# Patient Record
Sex: FEMALE | Race: WHITE | URBAN - METROPOLITAN AREA
[De-identification: names, ages, dates, MRNs, and addresses within clinical notes are randomized per-mention and may not be internally consistent; named-entity substitution may affect disease eponyms.]

---

## 2018-09-19 ENCOUNTER — INPATIENT (INPATIENT)
Facility: HOSPITAL | Age: 78
LOS: 7 days | Discharge: HOME HEALTH | End: 2018-09-27
Attending: HOSPITALIST | Admitting: HOSPITALIST

## 2018-09-19 VITALS
HEIGHT: 65 IN | TEMPERATURE: 100 F | SYSTOLIC BLOOD PRESSURE: 140 MMHG | DIASTOLIC BLOOD PRESSURE: 70 MMHG | HEART RATE: 113 BPM | RESPIRATION RATE: 18 BRPM | OXYGEN SATURATION: 100 % | WEIGHT: 149.91 LBS

## 2018-09-19 RX ORDER — ONDANSETRON 8 MG/1
4 TABLET, FILM COATED ORAL ONCE
Qty: 0 | Refills: 0 | Status: COMPLETED | OUTPATIENT
Start: 2018-09-19 | End: 2018-09-19

## 2018-09-19 RX ORDER — SODIUM CHLORIDE 9 MG/ML
1000 INJECTION INTRAMUSCULAR; INTRAVENOUS; SUBCUTANEOUS ONCE
Qty: 0 | Refills: 0 | Status: COMPLETED | OUTPATIENT
Start: 2018-09-19 | End: 2018-09-19

## 2018-09-20 DIAGNOSIS — E78.5 HYPERLIPIDEMIA, UNSPECIFIED: ICD-10-CM

## 2018-09-20 DIAGNOSIS — I10 ESSENTIAL (PRIMARY) HYPERTENSION: ICD-10-CM

## 2018-09-20 DIAGNOSIS — R10.31 RIGHT LOWER QUADRANT PAIN: ICD-10-CM

## 2018-09-20 DIAGNOSIS — J18.9 PNEUMONIA, UNSPECIFIED ORGANISM: ICD-10-CM

## 2018-09-20 LAB
ALBUMIN SERPL ELPH-MCNC: 4.1 G/DL — SIGNIFICANT CHANGE UP (ref 3.5–5.2)
ALP SERPL-CCNC: 53 U/L — SIGNIFICANT CHANGE UP (ref 30–115)
ALT FLD-CCNC: 12 U/L — SIGNIFICANT CHANGE UP (ref 0–41)
ANION GAP SERPL CALC-SCNC: 17 MMOL/L — HIGH (ref 7–14)
APPEARANCE UR: ABNORMAL
AST SERPL-CCNC: 19 U/L — SIGNIFICANT CHANGE UP (ref 0–41)
BACTERIA # UR AUTO: ABNORMAL
BASE EXCESS BLDA CALC-SCNC: 1.3 MMOL/L — SIGNIFICANT CHANGE UP (ref -2–2)
BASOPHILS # BLD AUTO: 0.02 K/UL — SIGNIFICANT CHANGE UP (ref 0–0.2)
BASOPHILS NFR BLD AUTO: 0.4 % — SIGNIFICANT CHANGE UP (ref 0–1)
BILIRUB SERPL-MCNC: 0.5 MG/DL — SIGNIFICANT CHANGE UP (ref 0.2–1.2)
BILIRUB UR-MCNC: NEGATIVE — SIGNIFICANT CHANGE UP
BUN SERPL-MCNC: 17 MG/DL — SIGNIFICANT CHANGE UP (ref 10–20)
CALCIUM SERPL-MCNC: 9.9 MG/DL — SIGNIFICANT CHANGE UP (ref 8.5–10.1)
CHLORIDE SERPL-SCNC: 98 MMOL/L — SIGNIFICANT CHANGE UP (ref 98–110)
CO2 SERPL-SCNC: 22 MMOL/L — SIGNIFICANT CHANGE UP (ref 17–32)
COLOR SPEC: YELLOW — SIGNIFICANT CHANGE UP
CREAT SERPL-MCNC: 0.8 MG/DL — SIGNIFICANT CHANGE UP (ref 0.7–1.5)
DIFF PNL FLD: NEGATIVE — SIGNIFICANT CHANGE UP
EOSINOPHIL # BLD AUTO: 0.04 K/UL — SIGNIFICANT CHANGE UP (ref 0–0.7)
EOSINOPHIL NFR BLD AUTO: 0.7 % — SIGNIFICANT CHANGE UP (ref 0–8)
EPI CELLS # UR: ABNORMAL /HPF
GLUCOSE SERPL-MCNC: 96 MG/DL — SIGNIFICANT CHANGE UP (ref 70–99)
GLUCOSE UR QL: NEGATIVE MG/DL — SIGNIFICANT CHANGE UP
HCO3 BLDA-SCNC: 25 MMOL/L — SIGNIFICANT CHANGE UP (ref 23–27)
HCT VFR BLD CALC: 37.3 % — SIGNIFICANT CHANGE UP (ref 37–47)
HGB BLD-MCNC: 12.9 G/DL — SIGNIFICANT CHANGE UP (ref 12–16)
HOROWITZ INDEX BLDA+IHG-RTO: 50 — SIGNIFICANT CHANGE UP
IMM GRANULOCYTES NFR BLD AUTO: 0.7 % — HIGH (ref 0.1–0.3)
KETONES UR-MCNC: NEGATIVE — SIGNIFICANT CHANGE UP
LACTATE SERPL-SCNC: 2.6 MMOL/L — HIGH (ref 0.5–2.2)
LEUKOCYTE ESTERASE UR-ACNC: ABNORMAL
LYMPHOCYTES # BLD AUTO: 0.17 K/UL — LOW (ref 1.2–3.4)
LYMPHOCYTES # BLD AUTO: 3 % — LOW (ref 20.5–51.1)
MCHC RBC-ENTMCNC: 29.7 PG — SIGNIFICANT CHANGE UP (ref 27–31)
MCHC RBC-ENTMCNC: 34.6 G/DL — SIGNIFICANT CHANGE UP (ref 32–37)
MCV RBC AUTO: 85.9 FL — SIGNIFICANT CHANGE UP (ref 81–99)
MONOCYTES # BLD AUTO: 0.43 K/UL — SIGNIFICANT CHANGE UP (ref 0.1–0.6)
MONOCYTES NFR BLD AUTO: 7.6 % — SIGNIFICANT CHANGE UP (ref 1.7–9.3)
NEUTROPHILS # BLD AUTO: 4.96 K/UL — SIGNIFICANT CHANGE UP (ref 1.4–6.5)
NEUTROPHILS NFR BLD AUTO: 87.6 % — HIGH (ref 42.2–75.2)
NITRITE UR-MCNC: NEGATIVE — SIGNIFICANT CHANGE UP
NRBC # BLD: 0 /100 WBCS — SIGNIFICANT CHANGE UP (ref 0–0)
PCO2 BLDA: 35 MMHG — LOW (ref 38–42)
PH BLDA: 7.46 — HIGH (ref 7.38–7.42)
PH UR: 6 — SIGNIFICANT CHANGE UP (ref 5–8)
PLATELET # BLD AUTO: 196 K/UL — SIGNIFICANT CHANGE UP (ref 130–400)
PO2 BLDA: 70 MMHG — LOW (ref 78–95)
POTASSIUM SERPL-MCNC: 4.1 MMOL/L — SIGNIFICANT CHANGE UP (ref 3.5–5)
POTASSIUM SERPL-SCNC: 4.1 MMOL/L — SIGNIFICANT CHANGE UP (ref 3.5–5)
PROT SERPL-MCNC: 6.8 G/DL — SIGNIFICANT CHANGE UP (ref 6–8)
PROT UR-MCNC: NEGATIVE MG/DL — SIGNIFICANT CHANGE UP
RBC # BLD: 4.34 M/UL — SIGNIFICANT CHANGE UP (ref 4.2–5.4)
RBC # FLD: 13.5 % — SIGNIFICANT CHANGE UP (ref 11.5–14.5)
RBC CASTS # UR COMP ASSIST: SIGNIFICANT CHANGE UP /HPF
SAO2 % BLDA: 96 % — SIGNIFICANT CHANGE UP (ref 94–98)
SODIUM SERPL-SCNC: 137 MMOL/L — SIGNIFICANT CHANGE UP (ref 135–146)
SP GR SPEC: <=1.005 — SIGNIFICANT CHANGE UP (ref 1.01–1.03)
TROPONIN T SERPL-MCNC: <0.01 NG/ML — SIGNIFICANT CHANGE UP
TROPONIN T SERPL-MCNC: <0.01 NG/ML — SIGNIFICANT CHANGE UP
UROBILINOGEN FLD QL: 0.2 MG/DL — SIGNIFICANT CHANGE UP (ref 0.2–0.2)
WBC # BLD: 5.66 K/UL — SIGNIFICANT CHANGE UP (ref 4.8–10.8)
WBC # FLD AUTO: 5.66 K/UL — SIGNIFICANT CHANGE UP (ref 4.8–10.8)
WBC UR QL: ABNORMAL /HPF

## 2018-09-20 RX ORDER — AZITHROMYCIN 500 MG/1
500 TABLET, FILM COATED ORAL ONCE
Qty: 0 | Refills: 0 | Status: COMPLETED | OUTPATIENT
Start: 2018-09-20 | End: 2018-09-20

## 2018-09-20 RX ORDER — ASPIRIN/CALCIUM CARB/MAGNESIUM 324 MG
81 TABLET ORAL DAILY
Qty: 0 | Refills: 0 | Status: DISCONTINUED | OUTPATIENT
Start: 2018-09-20 | End: 2018-09-27

## 2018-09-20 RX ORDER — ASPIRIN/CALCIUM CARB/MAGNESIUM 324 MG
81 TABLET ORAL ONCE
Qty: 0 | Refills: 0 | Status: COMPLETED | OUTPATIENT
Start: 2018-09-20 | End: 2018-09-20

## 2018-09-20 RX ORDER — INFLUENZA VIRUS VACCINE 15; 15; 15; 15 UG/.5ML; UG/.5ML; UG/.5ML; UG/.5ML
0.5 SUSPENSION INTRAMUSCULAR ONCE
Qty: 0 | Refills: 0 | Status: COMPLETED | OUTPATIENT
Start: 2018-09-20 | End: 2018-09-25

## 2018-09-20 RX ORDER — CEFTRIAXONE 500 MG/1
1 INJECTION, POWDER, FOR SOLUTION INTRAMUSCULAR; INTRAVENOUS ONCE
Qty: 0 | Refills: 0 | Status: COMPLETED | OUTPATIENT
Start: 2018-09-20 | End: 2018-09-20

## 2018-09-20 RX ORDER — NITROGLYCERIN 6.5 MG
0.4 CAPSULE, EXTENDED RELEASE ORAL
Qty: 0 | Refills: 0 | Status: DISCONTINUED | OUTPATIENT
Start: 2018-09-20 | End: 2018-09-23

## 2018-09-20 RX ORDER — ACETAMINOPHEN 500 MG
650 TABLET ORAL EVERY 6 HOURS
Qty: 0 | Refills: 0 | Status: DISCONTINUED | OUTPATIENT
Start: 2018-09-20 | End: 2018-09-27

## 2018-09-20 RX ORDER — SIMVASTATIN 20 MG/1
20 TABLET, FILM COATED ORAL AT BEDTIME
Qty: 0 | Refills: 0 | Status: DISCONTINUED | OUTPATIENT
Start: 2018-09-20 | End: 2018-09-27

## 2018-09-20 RX ORDER — SODIUM CHLORIDE 9 MG/ML
1000 INJECTION INTRAMUSCULAR; INTRAVENOUS; SUBCUTANEOUS
Qty: 0 | Refills: 0 | Status: DISCONTINUED | OUTPATIENT
Start: 2018-09-20 | End: 2018-09-21

## 2018-09-20 RX ORDER — ACETAMINOPHEN 500 MG
975 TABLET ORAL ONCE
Qty: 0 | Refills: 0 | Status: COMPLETED | OUTPATIENT
Start: 2018-09-20 | End: 2018-09-20

## 2018-09-20 RX ORDER — FAMOTIDINE 10 MG/ML
20 INJECTION INTRAVENOUS
Qty: 0 | Refills: 0 | Status: DISCONTINUED | OUTPATIENT
Start: 2018-09-20 | End: 2018-09-27

## 2018-09-20 RX ORDER — AZITHROMYCIN 500 MG/1
500 TABLET, FILM COATED ORAL EVERY 24 HOURS
Qty: 0 | Refills: 0 | Status: DISCONTINUED | OUTPATIENT
Start: 2018-09-20 | End: 2018-09-21

## 2018-09-20 RX ORDER — MORPHINE SULFATE 50 MG/1
4 CAPSULE, EXTENDED RELEASE ORAL ONCE
Qty: 0 | Refills: 0 | Status: DISCONTINUED | OUTPATIENT
Start: 2018-09-20 | End: 2018-09-20

## 2018-09-20 RX ORDER — CEFTRIAXONE 500 MG/1
1 INJECTION, POWDER, FOR SOLUTION INTRAMUSCULAR; INTRAVENOUS EVERY 24 HOURS
Qty: 0 | Refills: 0 | Status: DISCONTINUED | OUTPATIENT
Start: 2018-09-20 | End: 2018-09-21

## 2018-09-20 RX ORDER — MORPHINE SULFATE 50 MG/1
1 CAPSULE, EXTENDED RELEASE ORAL
Qty: 0 | Refills: 0 | Status: DISCONTINUED | OUTPATIENT
Start: 2018-09-20 | End: 2018-09-25

## 2018-09-20 RX ORDER — HEPARIN SODIUM 5000 [USP'U]/ML
5000 INJECTION INTRAVENOUS; SUBCUTANEOUS EVERY 12 HOURS
Qty: 0 | Refills: 0 | Status: DISCONTINUED | OUTPATIENT
Start: 2018-09-20 | End: 2018-09-27

## 2018-09-20 RX ORDER — SODIUM CHLORIDE 9 MG/ML
1000 INJECTION INTRAMUSCULAR; INTRAVENOUS; SUBCUTANEOUS ONCE
Qty: 0 | Refills: 0 | Status: COMPLETED | OUTPATIENT
Start: 2018-09-20 | End: 2018-09-20

## 2018-09-20 RX ADMIN — Medication 650 MILLIGRAM(S): at 14:46

## 2018-09-20 RX ADMIN — ONDANSETRON 4 MILLIGRAM(S): 8 TABLET, FILM COATED ORAL at 00:57

## 2018-09-20 RX ADMIN — HEPARIN SODIUM 5000 UNIT(S): 5000 INJECTION INTRAVENOUS; SUBCUTANEOUS at 17:07

## 2018-09-20 RX ADMIN — SODIUM CHLORIDE 1000 MILLILITER(S): 9 INJECTION INTRAMUSCULAR; INTRAVENOUS; SUBCUTANEOUS at 04:00

## 2018-09-20 RX ADMIN — SODIUM CHLORIDE 2000 MILLILITER(S): 9 INJECTION INTRAMUSCULAR; INTRAVENOUS; SUBCUTANEOUS at 00:56

## 2018-09-20 RX ADMIN — Medication 975 MILLIGRAM(S): at 00:57

## 2018-09-20 RX ADMIN — AZITHROMYCIN 255 MILLIGRAM(S): 500 TABLET, FILM COATED ORAL at 05:20

## 2018-09-20 RX ADMIN — FAMOTIDINE 20 MILLIGRAM(S): 10 INJECTION INTRAVENOUS at 17:06

## 2018-09-20 RX ADMIN — SIMVASTATIN 20 MILLIGRAM(S): 20 TABLET, FILM COATED ORAL at 21:29

## 2018-09-20 RX ADMIN — CEFTRIAXONE 100 GRAM(S): 500 INJECTION, POWDER, FOR SOLUTION INTRAMUSCULAR; INTRAVENOUS at 04:27

## 2018-09-20 RX ADMIN — MORPHINE SULFATE 4 MILLIGRAM(S): 50 CAPSULE, EXTENDED RELEASE ORAL at 00:57

## 2018-09-20 RX ADMIN — SODIUM CHLORIDE 2000 MILLILITER(S): 9 INJECTION INTRAMUSCULAR; INTRAVENOUS; SUBCUTANEOUS at 04:35

## 2018-09-20 RX ADMIN — Medication 650 MILLIGRAM(S): at 14:13

## 2018-09-20 RX ADMIN — MORPHINE SULFATE 4 MILLIGRAM(S): 50 CAPSULE, EXTENDED RELEASE ORAL at 15:17

## 2018-09-20 RX ADMIN — Medication 81 MILLIGRAM(S): at 14:14

## 2018-09-20 NOTE — H&P ADULT - NSHPLABSRESULTS_GEN_ALL_CORE
< from: CT Abdomen and Pelvis w/ IV Cont (09.20.18 @ 02:15) >    EXAM:  CT ABDOMEN AND PELVIS IC          PROCEDURE DATE:  09/20/2018      IMPRESSION:        No evidence of acute intra-abdominal or pelvic pathology.    SANIYA HAIR M.D., RESIDENT RADIOLOGIST  This document has been electronically signed.  LESLIE VILLAGRAN M.D., ATTENDING RADIOLOGIST  This document has been electronically signed. Sep 20 2018  3:18AM     < end of copied text >    < from: CT Head No Cont (09.20.18 @ 02:12) >    EXAM:  CT BRAIN          PROCEDURE DATE:  09/20/2018      IMPRESSION:     No evidence of acute intracranial pathology.      Chronic microvascular ischemic changes    SANIYA HAIR M.D., RESIDENT RADIOLOGIST  This document has been electronically signed.  LESLIE VILLAGRAN M.D., ATTENDING RADIOLOGIST  This document has been electronically signed. Sep 20 2018  3:08AM      < end of copied text >                          12.9   5.66  )-----------( 196      ( 20 Sep 2018 00:15 )             37.3     09-20    137  |  98  |  17  ----------------------------<  96  4.1   |  22  |  0.8    Ca    9.9      20 Sep 2018 00:15    TPro  6.8  /  Alb  4.1  /  TBili  0.5  /  DBili  x   /  AST  19  /  ALT  12  /  AlkPhos  53  09-20          Urinalysis Basic - ( 20 Sep 2018 04:15 )    Color: Yellow / Appearance: Slightly Cloudy / SG: <=1.005 / pH: x  Gluc: x / Ketone: Negative  / Bili: Negative / Urobili: 0.2 mg/dL   Blood: x / Protein: Negative mg/dL / Nitrite: Negative   Leuk Esterase: Trace / RBC: 1-2 /HPF / WBC 6-10 /HPF   Sq Epi: x / Non Sq Epi: Moderate /HPF / Bacteria: Moderate    Lactate Trend  09-20 @ 00:15 Lactate:2.6     CARDIAC MARKERS ( 20 Sep 2018 00:15 )  x     / <0.01 ng/mL / x     / x     / x          CAPILLARY BLOOD GLUCOSE

## 2018-09-20 NOTE — ED PROVIDER NOTE - NS ED ROS FT
Constitutional: No fever or chills. Normal appetite. No unintended weight loss.   Eyes: No vision changes.  ENT: No hearing changes. No ear pain. No sore throat.  Neck: No neck pain or stiffness.  Cardiovascular: No palpitations, or edema.  Pulmonary: No cough or SOB. No hemoptysis.  Abdominal: No abdominal pain, nausea, vomiting, or diarrhea.   : No dysuria or frequency. No hematuria.  Neuro: + headache. No syncope or dizziness.  MS: No back pain. No calf pain/swelling.  Psych: No suicidal or homicidal ideations.

## 2018-09-20 NOTE — H&P ADULT - HISTORY OF PRESENT ILLNESS
77yo female presents to the ER with 1 day of shortness of breath associated with "slight" cough, fever, chest pain, dizziness and weakness. She also notes an "upset" stomach. No pain levels mentioned. Patient took advil without relief so she came to the ER. Separately patient reports that she is urinating well. 77yo female presents to the ER with 1 day of shortness of breath associated with "slight" cough, fever, chest pain, dizziness and weakness. She also notes an "upset" stomach. No pain levels mentioned. Patient took advil without relief so she came to the ER. Separately patient reports that she is urinating well. There was report of pulse ox oxygen level of 88%

## 2018-09-20 NOTE — PATIENT PROFILE ADULT. - FAMILY HISTORY
Family history of anxiety disorder     Mother  Still living? No  Family history of anxiety disorder, Age at diagnosis: Age Unknown

## 2018-09-20 NOTE — CHART NOTE - NSCHARTNOTEFT_GEN_A_CORE
Patient c/o chest pain, low grade fever, hypoxic placed on ventimask saturating 93%, no tachycardia, no dyspnea, mild cough. Patient will be transferred to soft telemetry unit for further cardiac work up, f/up stat trops, 12 lead EKG, ABG. started on daily asa tx., NTG prn for chest pain, Morphine IV prn for severe chest pain.

## 2018-09-20 NOTE — ED PROVIDER NOTE - PHYSICAL EXAMINATION
Constitutional: Well developed, well nourished. NAD. Good general hygiene  Head: Atraumatic.  Eyes: EOMI without discomfort.   ENT: No nasal discharge. Mucous membranes moist.  Neck: Supple. Painless ROM.  Cardiovascular: Regular rhythm. Regular rate. Normal S1 and S2. No murmurs. 2+ pulses in all extremities.   Pulmonary: Normal respiratory rate and effort. Lungs clear to auscultation bilaterally. No wheezing, rales, or rhonchi. Bilateral, equal lung expansion.   Abdominal: Soft. Nondistended. LLQ abd tenderness without rebound or guarding.   Extremities: No lower extremity edema. Symmetric calves.  Skin: No rashes.   Neuro: AAOx3. No focal neurological deficits.  Psych: Normal mood. Normal affect.

## 2018-09-20 NOTE — ED PROVIDER NOTE - OBJECTIVE STATEMENT
78y F w PMH HTN, HLD presents for headache, chest pain and shaking chills. At approximately 1300, pt had 20 minutes of headache, CP, and chills that resolved spontaneously. at 1530, she had a recurrence which has persisted. Took 400mg motrin at that time which helped, but the headache returned to its original intensity 4 hours later, so pt presented to the ED.   +nausea but no vomiting. Pt did not take her temp at home. No diarrhea. No hx of bowel surgery.

## 2018-09-20 NOTE — ED PROVIDER NOTE - MEDICAL DECISION MAKING DETAILS
78F pmhx htn hld p/w fever chills  sob  started this afternoon -a/w. mild chest pain,  headache , no leg pain swelling, nonproductive cough,  no neck pain  no travel. no rash. PE alert nontoxic no pallor, cvs rrr resp bibasilar rhonchi, 88% RA abd soft nontender, no leg pain swelling,    Pt  SOb exertion  -  will admit

## 2018-09-21 LAB
ALBUMIN SERPL ELPH-MCNC: 3.3 G/DL — LOW (ref 3.5–5.2)
ALP SERPL-CCNC: 35 U/L — SIGNIFICANT CHANGE UP (ref 30–115)
ALT FLD-CCNC: 10 U/L — SIGNIFICANT CHANGE UP (ref 0–41)
ANION GAP SERPL CALC-SCNC: 11 MMOL/L — SIGNIFICANT CHANGE UP (ref 7–14)
AST SERPL-CCNC: 18 U/L — SIGNIFICANT CHANGE UP (ref 0–41)
BASE EXCESS BLDA CALC-SCNC: 3.9 MMOL/L — HIGH (ref -2–2)
BILIRUB SERPL-MCNC: 0.4 MG/DL — SIGNIFICANT CHANGE UP (ref 0.2–1.2)
BUN SERPL-MCNC: 13 MG/DL — SIGNIFICANT CHANGE UP (ref 10–20)
CALCIUM SERPL-MCNC: 8.1 MG/DL — LOW (ref 8.5–10.1)
CHLORIDE SERPL-SCNC: 98 MMOL/L — SIGNIFICANT CHANGE UP (ref 98–110)
CK SERPL-CCNC: 96 U/L — SIGNIFICANT CHANGE UP (ref 0–225)
CO2 SERPL-SCNC: 25 MMOL/L — SIGNIFICANT CHANGE UP (ref 17–32)
CREAT SERPL-MCNC: 0.8 MG/DL — SIGNIFICANT CHANGE UP (ref 0.7–1.5)
D DIMER BLD IA.RAPID-MCNC: 710 NG/ML DDU — HIGH (ref 0–230)
GLUCOSE BLDC GLUCOMTR-MCNC: 73 MG/DL — SIGNIFICANT CHANGE UP (ref 70–99)
GLUCOSE SERPL-MCNC: 143 MG/DL — HIGH (ref 70–99)
HCO3 BLDA-SCNC: 27 MMOL/L — SIGNIFICANT CHANGE UP (ref 23–27)
HCT VFR BLD CALC: 31.8 % — LOW (ref 37–47)
HCT VFR BLD CALC: 36 % — LOW (ref 37–47)
HGB BLD-MCNC: 10.4 G/DL — LOW (ref 12–16)
HGB BLD-MCNC: 12.2 G/DL — SIGNIFICANT CHANGE UP (ref 12–16)
HOROWITZ INDEX BLDA+IHG-RTO: 45 — SIGNIFICANT CHANGE UP
MCHC RBC-ENTMCNC: 29.2 PG — SIGNIFICANT CHANGE UP (ref 27–31)
MCHC RBC-ENTMCNC: 29.4 PG — SIGNIFICANT CHANGE UP (ref 27–31)
MCHC RBC-ENTMCNC: 32.7 G/DL — SIGNIFICANT CHANGE UP (ref 32–37)
MCHC RBC-ENTMCNC: 33.9 G/DL — SIGNIFICANT CHANGE UP (ref 32–37)
MCV RBC AUTO: 86.7 FL — SIGNIFICANT CHANGE UP (ref 81–99)
MCV RBC AUTO: 89.3 FL — SIGNIFICANT CHANGE UP (ref 81–99)
NRBC # BLD: 0 /100 WBCS — SIGNIFICANT CHANGE UP (ref 0–0)
NRBC # BLD: 0 /100 WBCS — SIGNIFICANT CHANGE UP (ref 0–0)
NRBC # BLD: 0 /100 — SIGNIFICANT CHANGE UP (ref 0–0)
PCO2 BLDA: 34 MMHG — LOW (ref 38–42)
PH BLDA: 7.51 — HIGH (ref 7.38–7.42)
PLAT MORPH BLD: NORMAL — SIGNIFICANT CHANGE UP
PLATELET # BLD AUTO: 133 K/UL — SIGNIFICANT CHANGE UP (ref 130–400)
PLATELET # BLD AUTO: 135 K/UL — SIGNIFICANT CHANGE UP (ref 130–400)
PO2 BLDA: 48 MMHG — LOW (ref 78–95)
POTASSIUM SERPL-MCNC: 3.7 MMOL/L — SIGNIFICANT CHANGE UP (ref 3.5–5)
POTASSIUM SERPL-SCNC: 3.7 MMOL/L — SIGNIFICANT CHANGE UP (ref 3.5–5)
PROT SERPL-MCNC: 5.1 G/DL — LOW (ref 6–8)
RBC # BLD: 3.56 M/UL — LOW (ref 4.2–5.4)
RBC # BLD: 4.15 M/UL — LOW (ref 4.2–5.4)
RBC # FLD: 13.4 % — SIGNIFICANT CHANGE UP (ref 11.5–14.5)
RBC # FLD: 13.9 % — SIGNIFICANT CHANGE UP (ref 11.5–14.5)
RBC BLD AUTO: NORMAL — SIGNIFICANT CHANGE UP
SAO2 % BLDA: 90 % — LOW (ref 94–98)
SODIUM SERPL-SCNC: 134 MMOL/L — LOW (ref 135–146)
TROPONIN T SERPL-MCNC: <0.01 NG/ML — SIGNIFICANT CHANGE UP
WBC # BLD: 2.91 K/UL — LOW (ref 4.8–10.8)
WBC # BLD: 5.58 K/UL — SIGNIFICANT CHANGE UP (ref 4.8–10.8)
WBC # FLD AUTO: 2.91 K/UL — LOW (ref 4.8–10.8)
WBC # FLD AUTO: 5.58 K/UL — SIGNIFICANT CHANGE UP (ref 4.8–10.8)

## 2018-09-21 RX ORDER — VANCOMYCIN HCL 1 G
1000 VIAL (EA) INTRAVENOUS EVERY 12 HOURS
Qty: 0 | Refills: 0 | Status: DISCONTINUED | OUTPATIENT
Start: 2018-09-22 | End: 2018-09-24

## 2018-09-21 RX ORDER — CEFEPIME 1 G/1
INJECTION, POWDER, FOR SOLUTION INTRAMUSCULAR; INTRAVENOUS
Qty: 0 | Refills: 0 | Status: DISCONTINUED | OUTPATIENT
Start: 2018-09-21 | End: 2018-09-26

## 2018-09-21 RX ORDER — IPRATROPIUM/ALBUTEROL SULFATE 18-103MCG
3 AEROSOL WITH ADAPTER (GRAM) INHALATION EVERY 6 HOURS
Qty: 0 | Refills: 0 | Status: DISCONTINUED | OUTPATIENT
Start: 2018-09-21 | End: 2018-09-26

## 2018-09-21 RX ORDER — VANCOMYCIN HCL 1 G
VIAL (EA) INTRAVENOUS
Qty: 0 | Refills: 0 | Status: DISCONTINUED | OUTPATIENT
Start: 2018-09-21 | End: 2018-09-24

## 2018-09-21 RX ORDER — MORPHINE SULFATE 50 MG/1
12 CAPSULE, EXTENDED RELEASE ORAL ONCE
Qty: 0 | Refills: 0 | Status: DISCONTINUED | OUTPATIENT
Start: 2018-09-21 | End: 2018-09-21

## 2018-09-21 RX ORDER — MORPHINE SULFATE 50 MG/1
1 CAPSULE, EXTENDED RELEASE ORAL ONCE
Qty: 0 | Refills: 0 | Status: DISCONTINUED | OUTPATIENT
Start: 2018-09-21 | End: 2018-09-26

## 2018-09-21 RX ORDER — VANCOMYCIN HCL 1 G
1000 VIAL (EA) INTRAVENOUS ONCE
Qty: 0 | Refills: 0 | Status: COMPLETED | OUTPATIENT
Start: 2018-09-21 | End: 2018-09-21

## 2018-09-21 RX ORDER — CEFEPIME 1 G/1
1000 INJECTION, POWDER, FOR SOLUTION INTRAMUSCULAR; INTRAVENOUS ONCE
Qty: 0 | Refills: 0 | Status: COMPLETED | OUTPATIENT
Start: 2018-09-21 | End: 2018-09-21

## 2018-09-21 RX ORDER — FUROSEMIDE 40 MG
40 TABLET ORAL ONCE
Qty: 0 | Refills: 0 | Status: COMPLETED | OUTPATIENT
Start: 2018-09-21 | End: 2018-09-21

## 2018-09-21 RX ORDER — DOCUSATE SODIUM 100 MG
100 CAPSULE ORAL
Qty: 0 | Refills: 0 | Status: DISCONTINUED | OUTPATIENT
Start: 2018-09-21 | End: 2018-09-27

## 2018-09-21 RX ORDER — CEFEPIME 1 G/1
1000 INJECTION, POWDER, FOR SOLUTION INTRAMUSCULAR; INTRAVENOUS EVERY 12 HOURS
Qty: 0 | Refills: 0 | Status: DISCONTINUED | OUTPATIENT
Start: 2018-09-22 | End: 2018-09-26

## 2018-09-21 RX ORDER — ONDANSETRON 8 MG/1
4 TABLET, FILM COATED ORAL EVERY 6 HOURS
Qty: 0 | Refills: 0 | Status: DISCONTINUED | OUTPATIENT
Start: 2018-09-21 | End: 2018-09-26

## 2018-09-21 RX ORDER — SODIUM CHLORIDE 9 MG/ML
1000 INJECTION INTRAMUSCULAR; INTRAVENOUS; SUBCUTANEOUS
Qty: 0 | Refills: 0 | Status: DISCONTINUED | OUTPATIENT
Start: 2018-09-21 | End: 2018-09-21

## 2018-09-21 RX ADMIN — MORPHINE SULFATE 1 MILLIGRAM(S): 50 CAPSULE, EXTENDED RELEASE ORAL at 15:26

## 2018-09-21 RX ADMIN — MORPHINE SULFATE 1 MILLIGRAM(S): 50 CAPSULE, EXTENDED RELEASE ORAL at 17:38

## 2018-09-21 RX ADMIN — Medication 3 MILLILITER(S): at 19:06

## 2018-09-21 RX ADMIN — Medication 250 MILLIGRAM(S): at 21:06

## 2018-09-21 RX ADMIN — MORPHINE SULFATE 1 MILLIGRAM(S): 50 CAPSULE, EXTENDED RELEASE ORAL at 22:02

## 2018-09-21 RX ADMIN — CEFEPIME 100 MILLIGRAM(S): 1 INJECTION, POWDER, FOR SOLUTION INTRAMUSCULAR; INTRAVENOUS at 21:07

## 2018-09-21 RX ADMIN — Medication 650 MILLIGRAM(S): at 17:38

## 2018-09-21 RX ADMIN — MORPHINE SULFATE 1 MILLIGRAM(S): 50 CAPSULE, EXTENDED RELEASE ORAL at 17:50

## 2018-09-21 RX ADMIN — CEFTRIAXONE 100 GRAM(S): 500 INJECTION, POWDER, FOR SOLUTION INTRAMUSCULAR; INTRAVENOUS at 05:29

## 2018-09-21 RX ADMIN — HEPARIN SODIUM 5000 UNIT(S): 5000 INJECTION INTRAVENOUS; SUBCUTANEOUS at 17:07

## 2018-09-21 RX ADMIN — FAMOTIDINE 20 MILLIGRAM(S): 10 INJECTION INTRAVENOUS at 05:31

## 2018-09-21 RX ADMIN — AZITHROMYCIN 255 MILLIGRAM(S): 500 TABLET, FILM COATED ORAL at 05:29

## 2018-09-21 RX ADMIN — MORPHINE SULFATE 1 MILLIGRAM(S): 50 CAPSULE, EXTENDED RELEASE ORAL at 18:42

## 2018-09-21 RX ADMIN — MORPHINE SULFATE 1 MILLIGRAM(S): 50 CAPSULE, EXTENDED RELEASE ORAL at 21:12

## 2018-09-21 RX ADMIN — Medication 81 MILLIGRAM(S): at 11:15

## 2018-09-21 RX ADMIN — Medication 40 MILLIGRAM(S): at 17:06

## 2018-09-21 RX ADMIN — MORPHINE SULFATE 1 MILLIGRAM(S): 50 CAPSULE, EXTENDED RELEASE ORAL at 14:37

## 2018-09-21 RX ADMIN — HEPARIN SODIUM 5000 UNIT(S): 5000 INJECTION INTRAVENOUS; SUBCUTANEOUS at 05:31

## 2018-09-21 RX ADMIN — Medication 650 MILLIGRAM(S): at 18:42

## 2018-09-21 RX ADMIN — Medication 100 MILLIGRAM(S): at 17:07

## 2018-09-21 RX ADMIN — SIMVASTATIN 20 MILLIGRAM(S): 20 TABLET, FILM COATED ORAL at 21:07

## 2018-09-21 RX ADMIN — FAMOTIDINE 20 MILLIGRAM(S): 10 INJECTION INTRAVENOUS at 17:07

## 2018-09-21 RX ADMIN — Medication 650 MILLIGRAM(S): at 23:16

## 2018-09-21 RX ADMIN — Medication 60 MILLIGRAM(S): at 21:07

## 2018-09-21 RX ADMIN — SODIUM CHLORIDE 75 MILLILITER(S): 9 INJECTION INTRAMUSCULAR; INTRAVENOUS; SUBCUTANEOUS at 10:36

## 2018-09-21 NOTE — PROVIDER CONTACT NOTE (OTHER) - BACKGROUND
awaiting CT chest to R/O PE-- as per CT department, the CT scan is "down" and the issue is being resolved-- dr santillan made aware

## 2018-09-21 NOTE — CHART NOTE - NSCHARTNOTEFT_GEN_A_CORE
Patient developed recurrent severe chest pain, relieved by 2 mg of IV Morphine, with worsening hypoxia requiring non-rebreather oxygen supplement, on stat EKG- NSR no acute st t  wave changes, no fever, b/p stable, on stat CT chest- No PE, bibasilar infiltrates, with mild pulmonary congestion and cardiomegaly. Post verbal discussion of patient's medical conditions with on call intensivist, patient will be upgraded to ICU level of care for further medical tx. IV abx. - will add IV Vanco/IV Levaquin, continue IV Rocephin tx., f/up repeated stat Troponin level and BNP level.   For mild pulmonary congestion patient was tx. with stat Lasix 40 mg IV push, had 770ml urine output.   abnormal radiology test results was d/w patient and patient's son.

## 2018-09-21 NOTE — PROVIDER CONTACT NOTE (OTHER) - SITUATION
pulse ox desaturated to 88% on 4L nc, pt placed on 55% venti mask pulse ox now 94%, pleuritic chest pain came down to 6/10 after morphine

## 2018-09-21 NOTE — CONSULT NOTE ADULT - SUBJECTIVE AND OBJECTIVE BOX
MAGDALENA MICHAUD    Female    Patient is a 78y old  Female who presents with a chief complaint of pneumonia (21 Sep 2018 10:25)      HPI:  77yo female presents to the ER with 1 day of shortness of breath associated with "slight" cough, fever, chest pain, dizziness and weakness. She also notes an "upset" stomach. No pain levels mentioned. Patient took advil without relief so she came to the ER. Separately patient reports that she is urinating well. There was report of pulse ox oxygen level of 88% (20 Sep 2018 05:00)      Allergies  No Known Allergies          FAMILY HISTORY:  Family history of anxiety disorder (Mother)  Family history of anxiety disorder        Marital Status:  (   )    (   ) Single    (   )    (  )   Occupation:   Lives with: (  ) alone  (  ) children   (  ) spouse   (  ) parents  (  ) other  Recent Travel:     Substance Use (street drugs): (  ) never used  (  ) other:  Tobacco Usage:  (   ) never smoked   (   ) former smoker   (   ) current smoker  (     ) pack year  Alcohol Usage:        HEALTH ISSUES - PROBLEM Dx:  Abdominal discomfort, bilateral lower quadrant: Abdominal discomfort, bilateral lower quadrant  Hyperlipidemia, unspecified hyperlipidemia type: Hyperlipidemia, unspecified hyperlipidemia type  Hypertension, unspecified type: Hypertension, unspecified type  Pneumonia: Pneumonia          Vital Signs Last 24 Hrs  T(C): 37.8 (21 Sep 2018 06:12), Max: 37.8 (20 Sep 2018 14:08)  T(F): 100 (21 Sep 2018 06:12), Max: 100.1 (20 Sep 2018 14:08)  HR: 56 (21 Sep 2018 07:56) (56 - 65)  BP: 122/69 (21 Sep 2018 06:12) (99/52 - 122/69)  RR: 18 (21 Sep 2018 06:12) (16 - 18)  SpO2: 93% (21 Sep 2018 11:17) (82% - 95%)    REVIEW OF SYSTEMS:  CONSTITUTIONAL: No fever, weight loss, or fatigue  EYES: No eye pain, visual disturbances, or discharge  NECK: No pain or stiffness  RESPIRATORY: +cough, wheezing, chills or hemoptysis; +shortness of breath  CARDIOVASCULAR: +chest pain, palpitations, dizziness, or leg swelling  GASTROINTESTINAL: No abdominal or epigastric pain. No nausea, vomiting, or hematemesis; No diarrhea or constipation. No melena or hematochezia.  GENITOURINARY: No dysuria, frequency, hematuria, or incontinence  NEUROLOGICAL: No headaches, memory loss, loss of strength, numbness, or tremors  MUSCULOSKELETAL: No joint pain or swelling; No muscle, back, or extremity pain                                  10.4   2.91  )-----------( 133      ( 21 Sep 2018 06:45 )             31.8       09-21    134<L>  |  98  |  13  ----------------------------<  143<H>  3.7   |  25  |  0.8    Ca    8.1<L>      21 Sep 2018 06:45    TPro  5.1<L>  /  Alb  3.3<L>  /  TBili  0.4  /  DBili  x   /  AST  18  /  ALT  10  /  AlkPhos  35  09-21      CARDIAC MARKERS ( 20 Sep 2018 14:23 )  x     / <0.01 ng/mL / x     / x     / x      CARDIAC MARKERS ( 20 Sep 2018 00:15 )  x     / <0.01 ng/mL / x     / x     / x            LIVER FUNCTIONS - ( 21 Sep 2018 06:45 )  Alb: 3.3 g/dL / Pro: 5.1 g/dL / ALK PHOS: 35 U/L / ALT: 10 U/L / AST: 18 U/L / GGT: x             Urinalysis Basic - ( 20 Sep 2018 04:15 )    Color: Yellow / Appearance: Slightly Cloudy / SG: <=1.005 / pH: x  Gluc: x / Ketone: Negative  / Bili: Negative / Urobili: 0.2 mg/dL   Blood: x / Protein: Negative mg/dL / Nitrite: Negative   Leuk Esterase: Trace / RBC: 1-2 /HPF / WBC 6-10 /HPF   Sq Epi: x / Non Sq Epi: Moderate /HPF / Bacteria: Moderate        ABG - ( 20 Sep 2018 14:58 )  pH, Arterial: 7.46  pH, Blood: x     /  pCO2: 35    /  pO2: 70    / HCO3: 25    / Base Excess: 1.3   /  SaO2: 96                      Radiology: Radiology personally reviewed. Multilobar pneumonia b/l lower lobes    MEDICATIONS  (STANDING):  aspirin enteric coated 81 milliGRAM(s) Oral daily  azithromycin  IVPB 500 milliGRAM(s) IV Intermittent every 24 hours  cefTRIAXone   IVPB 1 Gram(s) IV Intermittent every 24 hours  docusate sodium 100 milliGRAM(s) Oral two times a day  famotidine    Tablet 20 milliGRAM(s) Oral two times a day  heparin  Injectable 5000 Unit(s) SubCutaneous every 12 hours  influenza   Vaccine 0.5 milliLiter(s) IntraMuscular once  simvastatin 20 milliGRAM(s) Oral at bedtime  sodium chloride 0.9%. 1000 milliLiter(s) (75 mL/Hr) IV Continuous <Continuous>    MEDICATIONS  (PRN):  acetaminophen   Tablet .. 650 milliGRAM(s) Oral every 6 hours PRN Temp greater or equal to 38C (100.4F), Mild Pain (1 - 3)  morphine  - Injectable 1 milliGRAM(s) IV Push every 1 hour PRN severe chest pain  nitroglycerin     SubLingual 0.4 milliGRAM(s) SubLingual every 5 minutes PRN Chest Pain  ondansetron Injectable 4 milliGRAM(s) IV Push every 6 hours PRN Nausea and/or Vomiting      Prescriptions:        PHYSICAL EXAM:  GENERAL: NAD, well-groomed, well-developed  HEAD:  Atraumatic, Normocephalic  EYES: EOMI, PERRLA, conjunctiva and sclera clear  ENMT: No tonsillar erythema, exudates, or enlargement; Moist mucous membranes, Good dentition, No lesions  NECK: Supple, No JVD, Normal thyroid  NERVOUS SYSTEM:  Alert & Oriented X3,  Motor Strength 5/5 B/L upper and lower extremities  CHEST/LUNG: equal bilaterally; +rales,+ rhonchi, wheezing, or rubs  HEART: Regular rate and rhythm; No murmurs, rubs, or gallops  ABDOMEN: Soft, Nontender, Nondistended; Bowel sounds present  EXTREMITIES:   No clubbing, cyanosis, or edema, full ROM  LYMPH: No lymphadenopathy noted in cervical or supraclavicular area  SKIN: No rashes or lesions observed MAGDALENA MICHAUD    Female    Patient is a 78y old  Female who presents with a chief complaint of pneumonia (21 Sep 2018 10:25)      HPI:  77yo female presents to the ER with 1 day of shortness of breath associated with "slight" cough, fever, chest pain, dizziness and weakness. She also notes an "upset" stomach. No pain levels mentioned. Patient took advil without relief so she came to the ER. Separately patient reports that she is urinating well. There was report of pulse ox oxygen level of 88% (20 Sep 2018 05:00).    Since admission she is feeling better though still weak.      Allergies  No Known Allergies          FAMILY HISTORY:  Family history of anxiety disorder (Mother)  Family history of anxiety disorder        Marital Status:  (  x )    (   ) Single    (   )    (  )   Occupation:   Lives with: (  ) alone  (  ) children   ( x ) spouse   (  ) parents  (  ) other  Recent Travel:     Substance Use (street drugs): ( x ) never used  (  ) other:  Tobacco Usage:  ( x  ) never smoked   (   ) former smoker   (   ) current smoker  (     ) pack year          HEALTH ISSUES - PROBLEM Dx:  Abdominal discomfort, bilateral lower quadrant: Abdominal discomfort, bilateral lower quadrant  Hyperlipidemia, unspecified hyperlipidemia type: Hyperlipidemia, unspecified hyperlipidemia type  Hypertension, unspecified type: Hypertension, unspecified type  Pneumonia: Pneumonia          Vital Signs Last 24 Hrs  T(C): 37.8 (21 Sep 2018 06:12), Max: 37.8 (20 Sep 2018 14:08)  T(F): 100 (21 Sep 2018 06:12), Max: 100.1 (20 Sep 2018 14:08)  HR: 56 (21 Sep 2018 07:56) (56 - 65)  BP: 122/69 (21 Sep 2018 06:12) (99/52 - 122/69)  RR: 18 (21 Sep 2018 06:12) (16 - 18)  SpO2: 93% (21 Sep 2018 11:17) (82% - 95%)    REVIEW OF SYSTEMS:  CONSTITUTIONAL: No fever, weight loss, or fatigue  EYES: No eye pain, visual disturbances, or discharge  NECK: No pain or stiffness  RESPIRATORY: +cough, wheezing, chills or hemoptysis; +shortness of breath  CARDIOVASCULAR: +chest pain, palpitations, dizziness, or leg swelling  GASTROINTESTINAL: No abdominal or epigastric pain. No nausea, vomiting, or hematemesis; No diarrhea or constipation. No melena or hematochezia.  GENITOURINARY: No dysuria, frequency, hematuria, or incontinence  NEUROLOGICAL: No headaches, memory loss, loss of strength, numbness, or tremors  MUSCULOSKELETAL: No joint pain or swelling; No muscle, back, or extremity pain                                  10.4   2.91  )-----------( 133      ( 21 Sep 2018 06:45 )             31.8       09-21    134<L>  |  98  |  13  ----------------------------<  143<H>  3.7   |  25  |  0.8    Ca    8.1<L>      21 Sep 2018 06:45    TPro  5.1<L>  /  Alb  3.3<L>  /  TBili  0.4  /  DBili  x   /  AST  18  /  ALT  10  /  AlkPhos  35  09-21      CARDIAC MARKERS ( 20 Sep 2018 14:23 )  x     / <0.01 ng/mL / x     / x     / x      CARDIAC MARKERS ( 20 Sep 2018 00:15 )  x     / <0.01 ng/mL / x     / x     / x            LIVER FUNCTIONS - ( 21 Sep 2018 06:45 )  Alb: 3.3 g/dL / Pro: 5.1 g/dL / ALK PHOS: 35 U/L / ALT: 10 U/L / AST: 18 U/L / GGT: x             Urinalysis Basic - ( 20 Sep 2018 04:15 )    Color: Yellow / Appearance: Slightly Cloudy / SG: <=1.005 / pH: x  Gluc: x / Ketone: Negative  / Bili: Negative / Urobili: 0.2 mg/dL   Blood: x / Protein: Negative mg/dL / Nitrite: Negative   Leuk Esterase: Trace / RBC: 1-2 /HPF / WBC 6-10 /HPF   Sq Epi: x / Non Sq Epi: Moderate /HPF / Bacteria: Moderate        ABG - ( 20 Sep 2018 14:58 )  pH, Arterial: 7.46  pH, Blood: x     /  pCO2: 35    /  pO2: 70    / HCO3: 25    / Base Excess: 1.3   /  SaO2: 96                      Radiology: Radiology personally reviewed. Multilobar pneumonia b/l lower lobes    MEDICATIONS  (STANDING):  aspirin enteric coated 81 milliGRAM(s) Oral daily  azithromycin  IVPB 500 milliGRAM(s) IV Intermittent every 24 hours  cefTRIAXone   IVPB 1 Gram(s) IV Intermittent every 24 hours  docusate sodium 100 milliGRAM(s) Oral two times a day  famotidine    Tablet 20 milliGRAM(s) Oral two times a day  heparin  Injectable 5000 Unit(s) SubCutaneous every 12 hours  influenza   Vaccine 0.5 milliLiter(s) IntraMuscular once  simvastatin 20 milliGRAM(s) Oral at bedtime  sodium chloride 0.9%. 1000 milliLiter(s) (75 mL/Hr) IV Continuous <Continuous>    MEDICATIONS  (PRN):  acetaminophen   Tablet .. 650 milliGRAM(s) Oral every 6 hours PRN Temp greater or equal to 38C (100.4F), Mild Pain (1 - 3)  morphine  - Injectable 1 milliGRAM(s) IV Push every 1 hour PRN severe chest pain  nitroglycerin     SubLingual 0.4 milliGRAM(s) SubLingual every 5 minutes PRN Chest Pain  ondansetron Injectable 4 milliGRAM(s) IV Push every 6 hours PRN Nausea and/or Vomiting      Prescriptions:        PHYSICAL EXAM:  GENERAL: NAD, well-groomed, well-developed  HEAD:  Atraumatic, Normocephalic  EYES: EOMI, PERRLA, conjunctiva and sclera clear  ENMT: No tonsillar erythema, exudates, or enlargement; Moist mucous membranes, Good dentition, No lesions  NECK: Supple, No JVD, Normal thyroid  NERVOUS SYSTEM:  Alert & Oriented X3,  Motor Strength 5/5 B/L upper and lower extremities  CHEST/LUNG: equal bilaterally; +rales,+ rhonchi, wheezing, or rubs  HEART: Regular rate and rhythm; No murmurs, rubs, or gallops  ABDOMEN: Soft, Nontender, Nondistended; Bowel sounds present  EXTREMITIES:   No clubbing, cyanosis, or edema, full ROM  LYMPH: No lymphadenopathy noted in cervical or supraclavicular area  SKIN: No rashes or lesions observed

## 2018-09-21 NOTE — CONSULT NOTE ADULT - ASSESSMENT
Impression:  Community Acquired Pneumonia    Pneumococcus v. GNR (age)  leukopenia, may be sepsis related      Suggest:  Supportive care  recheck WBC BMP  Continue current abx.  full cultures especially sputum if available  check , mycoplasma titers and Legionella capsular Ag. given hyponatremia  Pulmonary toilet  Keep SaO2 >92%  The patient needs repeat CXR/CT in 3-4 months to document clearance
IMPRESSION: ACUTE HYPOXIC RESPIRATORY FAILURE/ SEVERE PNA/ L SIDED PLEURITIC CP/ WORSENING STATUS ON ABX      PLAN:    CNS: AVOID CNS DEPRESSANT    HEENT:  Oral care    PULMONARY:  HOB @ 45 degrees, MAINTAIN SAO2 MORE THAN 92% CAN USE HFNC/ OR BIPAP, SOLUMEDROL 60 MG Q 12H, REPEAT CXR    CARDIOVASCULAR: HOLD DIURETICS/ IF DEC BP IVF, BNP    GI: GI prophylaxis                                          Feeding CLEAR LIQUID    RENAL:  F/u  lytes.  Correct as needed. accurate I/O    INFECTIOUS DISEASE: LEVAQUIN 750/ CEFEPIME/ VANCO, PANCX, URINE LEGIONELLA    HEMATOLOGICAL:  DVT prophylaxis.    ENDOCRINE:  Follow up FS.  Insulin protocol if needed.    MUSCULOSKELETAL: BED REST    CODE STATUS: FULL CODE    DISPOSITION: Pt requiresmonitoring in the MICU  SPOKE AT LENGTH WITH PATIENT / SON AT BEDSIDE

## 2018-09-21 NOTE — CONSULT NOTE ADULT - SUBJECTIVE AND OBJECTIVE BOX
Patient is a 78y old  Female who presents with a chief complaint of fever, chills (21 Sep 2018 12:18)      HPI:  79yo female presents to the ER with 1 day of shortness of breath associated with "slight" cough, fever, chest pain, dizziness and weakness. She also notes an "upset" stomach. No pain levels mentioned. Patient took advil without relief so she came to the ER. Separately patient reports that she is urinating well. There was report of pulse ox oxygen level of 88% (20 Sep 2018 05:00), admitted to [facility] with diagnosis of pneumonia started on abx, seen by pulmonary, HER D DIMER WAS HIGH TO CT ANGIO WAS ORDERED NEG FOR PE HOWEVER BIBASILAR CONSOLIDATION WORSENING. 1 H AGO RRT WAS CALLED FOR DESATURATION, CALLED BY HOSPITALIST FOR UPGRADE. SEEN/ SON AT BEDSIDE, L SIDED PLEURITIC CP, COUGH, SLIGHTLY TACHYPNEIC.      PAST MEDICAL & SURGICAL HISTORY:  Hyperlipidemia  Hypertension, unspecified type  No significant past surgical history      SOCIAL HX:   Smoking denies    FAMILY HISTORY:  Family history of anxiety disorder (Mother)  Family history of anxiety disorder      REVIEW OF SYSTEMS SEE HPI    Allergies    No Known Allergies    Intolerances        acetaminophen   Tablet .. 650 milliGRAM(s) Oral every 6 hours PRN  ALBUTerol/ipratropium for Nebulization 3 milliLiter(s) Nebulizer every 6 hours  aspirin enteric coated 81 milliGRAM(s) Oral daily  azithromycin  IVPB 500 milliGRAM(s) IV Intermittent every 24 hours  cefTRIAXone   IVPB 1 Gram(s) IV Intermittent every 24 hours  docusate sodium 100 milliGRAM(s) Oral two times a day  famotidine    Tablet 20 milliGRAM(s) Oral two times a day  heparin  Injectable 5000 Unit(s) SubCutaneous every 12 hours  influenza   Vaccine 0.5 milliLiter(s) IntraMuscular once  levoFLOXacin IVPB 500 milliGRAM(s) IV Intermittent once  morphine  - Injectable 1 milliGRAM(s) IV Push once  morphine  - Injectable 1 milliGRAM(s) IV Push every 1 hour PRN  nitroglycerin     SubLingual 0.4 milliGRAM(s) SubLingual every 5 minutes PRN  ondansetron Injectable 4 milliGRAM(s) IV Push every 6 hours PRN  simvastatin 20 milliGRAM(s) Oral at bedtime  vancomycin  IVPB      vancomycin  IVPB 1000 milliGRAM(s) IV Intermittent once  : Home Meds:      PHYSICAL EXAM    ICU Vital Signs Last 24 Hrs  T(C): 36.9 (21 Sep 2018 14:30), Max: 37.8 (21 Sep 2018 06:12)  T(F): 98.5 (21 Sep 2018 14:30), Max: 100 (21 Sep 2018 06:12)  HR: 72 (21 Sep 2018 18:18) (47 - 72)  BP: 142/64 (21 Sep 2018 18:18) (116/60 - 142/64)--  RR: 18 (21 Sep 2018 15:20) (16 - 20)  SpO2: 94% (21 Sep 2018 15:20) (82% - 94%)      General:  HEENT:  SHANNON / DRY ORAL MUCOSA            Lymph Nodes: No cervical LN   Lungs: Bilateral BS, BIBASILAR CRACKLES  Cardiovascular: Regular  Abdomen: Soft, Positive BS  Extremities: No clubbing  Skin: Warm  Neurological: Non focal       09-21-18 @ 07:01  -  09-21-18 @ 18:32  --------------------------------------------------------  IN:    sodium chloride 0.9%: 800 mL  Total IN: 800 mL    OUT:    Voided: 920 mL  Total OUT: 920 mL    Total NET: -120 mL          LABS:                          10.4   2.91  )-----------( 133      ( 21 Sep 2018 06:45 )             31.8                                               09-21    134<L>  |  98  |  13  ----------------------------<  143<H>  3.7   |  25  |  0.8    Ca    8.1<L>      21 Sep 2018 06:45    TPro  5.1<L>  /  Alb  3.3<L>  /  TBili  0.4  /  DBili  x   /  AST  18  /  ALT  10  /  AlkPhos  35  09-21                                             Urinalysis Basic - ( 20 Sep 2018 04:15 )    Color: Yellow / Appearance: Slightly Cloudy / SG: <=1.005 / pH: x  Gluc: x / Ketone: Negative  / Bili: Negative / Urobili: 0.2 mg/dL   Blood: x / Protein: Negative mg/dL / Nitrite: Negative   Leuk Esterase: Trace / RBC: 1-2 /HPF / WBC 6-10 /HPF   Sq Epi: x / Non Sq Epi: Moderate /HPF / Bacteria: Moderate        CARDIAC MARKERS ( 20 Sep 2018 14:23 )  x     / <0.01 ng/mL / x     / x     / x      CARDIAC MARKERS ( 20 Sep 2018 00:15 )  x     / <0.01 ng/mL / x     / x     / x                                                LIVER FUNCTIONS - ( 21 Sep 2018 06:45 )  Alb: 3.3 g/dL / Pro: 5.1 g/dL / ALK PHOS: 35 U/L / ALT: 10 U/L / AST: 18 U/L / GGT: x                                                                                                                                   ABG - ( 21 Sep 2018 18:07 )  pH, Arterial: 7.51  pH, Blood: x     /  pCO2: 34    /  pO2: 48    / HCO3: 27    / Base Excess: 3.9   /  SaO2: 90                  X-RayS/ CT REVIEWED    MEDICATIONS  (STANDING):  ALBUTerol/ipratropium for Nebulization 3 milliLiter(s) Nebulizer every 6 hours  aspirin enteric coated 81 milliGRAM(s) Oral daily  azithromycin  IVPB 500 milliGRAM(s) IV Intermittent every 24 hours  cefTRIAXone   IVPB 1 Gram(s) IV Intermittent every 24 hours  docusate sodium 100 milliGRAM(s) Oral two times a day  famotidine    Tablet 20 milliGRAM(s) Oral two times a day  heparin  Injectable 5000 Unit(s) SubCutaneous every 12 hours  influenza   Vaccine 0.5 milliLiter(s) IntraMuscular once  levoFLOXacin IVPB 500 milliGRAM(s) IV Intermittent once  morphine  - Injectable 1 milliGRAM(s) IV Push once  simvastatin 20 milliGRAM(s) Oral at bedtime  vancomycin  IVPB      vancomycin  IVPB 1000 milliGRAM(s) IV Intermittent once    MEDICATIONS  (PRN):  acetaminophen   Tablet .. 650 milliGRAM(s) Oral every 6 hours PRN Temp greater or equal to 38C (100.4F), Mild Pain (1 - 3)  morphine  - Injectable 1 milliGRAM(s) IV Push every 1 hour PRN severe chest pain  nitroglycerin     SubLingual 0.4 milliGRAM(s) SubLingual every 5 minutes PRN Chest Pain  ondansetron Injectable 4 milliGRAM(s) IV Push every 6 hours PRN Nausea and/or Vomiting

## 2018-09-22 LAB
ALBUMIN SERPL ELPH-MCNC: 3.9 G/DL — SIGNIFICANT CHANGE UP (ref 3.5–5.2)
ALP SERPL-CCNC: 43 U/L — SIGNIFICANT CHANGE UP (ref 30–115)
ALT FLD-CCNC: 13 U/L — SIGNIFICANT CHANGE UP (ref 0–41)
ANION GAP SERPL CALC-SCNC: 16 MMOL/L — HIGH (ref 7–14)
AST SERPL-CCNC: 19 U/L — SIGNIFICANT CHANGE UP (ref 0–41)
BILIRUB SERPL-MCNC: 0.4 MG/DL — SIGNIFICANT CHANGE UP (ref 0.2–1.2)
BUN SERPL-MCNC: 9 MG/DL — LOW (ref 10–20)
CALCIUM SERPL-MCNC: 9 MG/DL — SIGNIFICANT CHANGE UP (ref 8.5–10.1)
CHLORIDE SERPL-SCNC: 100 MMOL/L — SIGNIFICANT CHANGE UP (ref 98–110)
CO2 SERPL-SCNC: 26 MMOL/L — SIGNIFICANT CHANGE UP (ref 17–32)
CREAT SERPL-MCNC: 0.5 MG/DL — LOW (ref 0.7–1.5)
CULTURE RESULTS: NO GROWTH — SIGNIFICANT CHANGE UP
GLUCOSE BLDC GLUCOMTR-MCNC: 130 MG/DL — HIGH (ref 70–99)
GLUCOSE BLDC GLUCOMTR-MCNC: 141 MG/DL — HIGH (ref 70–99)
GLUCOSE BLDC GLUCOMTR-MCNC: 222 MG/DL — HIGH (ref 70–99)
GLUCOSE SERPL-MCNC: 175 MG/DL — HIGH (ref 70–99)
HCT VFR BLD CALC: 36.9 % — LOW (ref 37–47)
HGB BLD-MCNC: 12.7 G/DL — SIGNIFICANT CHANGE UP (ref 12–16)
LEGIONELLA AG UR QL: NEGATIVE — SIGNIFICANT CHANGE UP
MCHC RBC-ENTMCNC: 29.8 PG — SIGNIFICANT CHANGE UP (ref 27–31)
MCHC RBC-ENTMCNC: 34.4 G/DL — SIGNIFICANT CHANGE UP (ref 32–37)
MCV RBC AUTO: 86.6 FL — SIGNIFICANT CHANGE UP (ref 81–99)
NRBC # BLD: 0 /100 WBCS — SIGNIFICANT CHANGE UP (ref 0–0)
PLATELET # BLD AUTO: 130 K/UL — SIGNIFICANT CHANGE UP (ref 130–400)
POTASSIUM SERPL-MCNC: 3.5 MMOL/L — SIGNIFICANT CHANGE UP (ref 3.5–5)
POTASSIUM SERPL-SCNC: 3.5 MMOL/L — SIGNIFICANT CHANGE UP (ref 3.5–5)
PROT SERPL-MCNC: 6.4 G/DL — SIGNIFICANT CHANGE UP (ref 6–8)
RBC # BLD: 4.26 M/UL — SIGNIFICANT CHANGE UP (ref 4.2–5.4)
RBC # FLD: 13.2 % — SIGNIFICANT CHANGE UP (ref 11.5–14.5)
SODIUM SERPL-SCNC: 142 MMOL/L — SIGNIFICANT CHANGE UP (ref 135–146)
SPECIMEN SOURCE: SIGNIFICANT CHANGE UP
WBC # BLD: 3.24 K/UL — LOW (ref 4.8–10.8)
WBC # FLD AUTO: 3.24 K/UL — LOW (ref 4.8–10.8)

## 2018-09-22 RX ADMIN — HEPARIN SODIUM 5000 UNIT(S): 5000 INJECTION INTRAVENOUS; SUBCUTANEOUS at 18:59

## 2018-09-22 RX ADMIN — CEFEPIME 100 MILLIGRAM(S): 1 INJECTION, POWDER, FOR SOLUTION INTRAMUSCULAR; INTRAVENOUS at 05:33

## 2018-09-22 RX ADMIN — Medication 60 MILLIGRAM(S): at 05:32

## 2018-09-22 RX ADMIN — Medication 3 MILLILITER(S): at 13:17

## 2018-09-22 RX ADMIN — Medication 3 MILLILITER(S): at 08:03

## 2018-09-22 RX ADMIN — HEPARIN SODIUM 5000 UNIT(S): 5000 INJECTION INTRAVENOUS; SUBCUTANEOUS at 05:32

## 2018-09-22 RX ADMIN — CEFEPIME 100 MILLIGRAM(S): 1 INJECTION, POWDER, FOR SOLUTION INTRAMUSCULAR; INTRAVENOUS at 19:04

## 2018-09-22 RX ADMIN — Medication 81 MILLIGRAM(S): at 12:46

## 2018-09-22 RX ADMIN — FAMOTIDINE 20 MILLIGRAM(S): 10 INJECTION INTRAVENOUS at 18:58

## 2018-09-22 RX ADMIN — FAMOTIDINE 20 MILLIGRAM(S): 10 INJECTION INTRAVENOUS at 05:32

## 2018-09-22 RX ADMIN — SIMVASTATIN 20 MILLIGRAM(S): 20 TABLET, FILM COATED ORAL at 21:07

## 2018-09-22 RX ADMIN — Medication 60 MILLIGRAM(S): at 18:59

## 2018-09-22 RX ADMIN — Medication 250 MILLIGRAM(S): at 21:08

## 2018-09-22 RX ADMIN — Medication 250 MILLIGRAM(S): at 06:50

## 2018-09-22 RX ADMIN — Medication 100 MILLIGRAM(S): at 18:58

## 2018-09-22 RX ADMIN — Medication 100 MILLIGRAM(S): at 05:32

## 2018-09-22 NOTE — PROGRESS NOTE ADULT - SUBJECTIVE AND OBJECTIVE BOX
MAGDALENA MICHAUD    Patient is a 78y old  Female who presents with a chief complaint of pneumonia (21 Sep 2018 18:32)      Interval History/Overnight events:    T(C): 36.6 (09-22-18 @ 07:06), Max: 38.7 (09-21-18 @ 19:40)  HR: 47 (09-22-18 @ 08:00)  BP: 130/61 (09-22-18 @ 07:06)  RR: 16 (09-22-18 @ 08:00)  SpO2: 97% (09-22-18 @ 08:00)    PHYSICAL EXAM:    GENERAL:   HEENT: Anicteric sclera, EOMI  CHEST/LUNG: Clear to auscultation bilaterally  HEART: Regular rate and rhythm; No murmurs, rubs, or gallops  ABDOMEN: Soft, Nontender, Nondistended; Bowel sounds present  EXTREMITIES: No clubbing, cyanosis, or edema      LABS:                          12.7   3.24  )-----------( 130      ( 22 Sep 2018 06:35 )             36.9     09-22    142  |  100  |  9<L>  ----------------------------<  175<H>  3.5   |  26  |  0.5<L>    Ca    9.0      22 Sep 2018 06:35    TPro  6.4  /  Alb  3.9  /  TBili  0.4  /  DBili  x   /  AST  19  /  ALT  13  /  AlkPhos  43  09-22      CARDIAC MARKERS ( 21 Sep 2018 18:11 )  x     / <0.01 ng/mL / 96 U/L / x     / x      CARDIAC MARKERS ( 20 Sep 2018 14:23 )  x     / <0.01 ng/mL / x     / x     / x          LIVER FUNCTIONS - ( 22 Sep 2018 06:35 )  Alb: 3.9 g/dL / Pro: 6.4 g/dL / ALK PHOS: 43 U/L / ALT: 13 U/L / AST: 19 U/L / GGT: x             MEDICATIONS:    MEDICATIONS  (STANDING):  ALBUTerol/ipratropium for Nebulization 3 milliLiter(s) Nebulizer every 6 hours  aspirin enteric coated 81 milliGRAM(s) Oral daily  cefepime   IVPB      cefepime   IVPB 1000 milliGRAM(s) IV Intermittent every 12 hours  docusate sodium 100 milliGRAM(s) Oral two times a day  famotidine    Tablet 20 milliGRAM(s) Oral two times a day  heparin  Injectable 5000 Unit(s) SubCutaneous every 12 hours  influenza   Vaccine 0.5 milliLiter(s) IntraMuscular once  methylPREDNISolone sodium succinate Injectable 60 milliGRAM(s) IV Push every 8 hours  morphine  - Injectable 1 milliGRAM(s) IV Push once  simvastatin 20 milliGRAM(s) Oral at bedtime  vancomycin  IVPB      vancomycin  IVPB 1000 milliGRAM(s) IV Intermittent every 12 hours      MEDICATIONS  (PRN):  acetaminophen   Tablet .. 650 milliGRAM(s) Oral every 6 hours PRN Temp greater or equal to 38C (100.4F), Mild Pain (1 - 3)  morphine  - Injectable 1 milliGRAM(s) IV Push every 1 hour PRN severe chest pain  nitroglycerin     SubLingual 0.4 milliGRAM(s) SubLingual every 5 minutes PRN Chest Pain  ondansetron Injectable 4 milliGRAM(s) IV Push every 6 hours PRN Nausea and/or Vomiting MAGDALENA MICHAUD    Patient is a 78y old  Female who presents with a chief complaint of pneumonia (21 Sep 2018 18:32)      Interval History/Overnight events:    Upgraded to ICU for oxygen desaturation  - on 4L NC today, saturating 94%  - pt reports feeling better    T(C): 36.6 (09-22-18 @ 07:06), Max: 38.7 (09-21-18 @ 19:40)  HR: 47 (09-22-18 @ 08:00)  BP: 130/61 (09-22-18 @ 07:06)  RR: 16 (09-22-18 @ 08:00)  SpO2: 97% (09-22-18 @ 08:00)    PHYSICAL EXAM:    GENERAL: NAD   HEENT: Anicteric sclera, EOMI  CHEST/LUNG: bibasilar decreased BS   HEART: Regular rate and rhythm; No murmurs, rubs, or gallops  ABDOMEN: Soft, Nontender, Nondistended; Bowel sounds present  EXTREMITIES: No clubbing, cyanosis, or edema      LABS:                          12.7   3.24  )-----------( 130      ( 22 Sep 2018 06:35 )             36.9     09-22    142  |  100  |  9<L>  ----------------------------<  175<H>  3.5   |  26  |  0.5<L>    Ca    9.0      22 Sep 2018 06:35    TPro  6.4  /  Alb  3.9  /  TBili  0.4  /  DBili  x   /  AST  19  /  ALT  13  /  AlkPhos  43  09-22      CARDIAC MARKERS ( 21 Sep 2018 18:11 )  x     / <0.01 ng/mL / 96 U/L / x     / x      CARDIAC MARKERS ( 20 Sep 2018 14:23 )  x     / <0.01 ng/mL / x     / x     / x          LIVER FUNCTIONS - ( 22 Sep 2018 06:35 )  Alb: 3.9 g/dL / Pro: 6.4 g/dL / ALK PHOS: 43 U/L / ALT: 13 U/L / AST: 19 U/L / GGT: x             MEDICATIONS:    MEDICATIONS  (STANDING):  ALBUTerol/ipratropium for Nebulization 3 milliLiter(s) Nebulizer every 6 hours  aspirin enteric coated 81 milliGRAM(s) Oral daily  cefepime   IVPB      cefepime   IVPB 1000 milliGRAM(s) IV Intermittent every 12 hours  docusate sodium 100 milliGRAM(s) Oral two times a day  famotidine    Tablet 20 milliGRAM(s) Oral two times a day  heparin  Injectable 5000 Unit(s) SubCutaneous every 12 hours  influenza   Vaccine 0.5 milliLiter(s) IntraMuscular once  methylPREDNISolone sodium succinate Injectable 60 milliGRAM(s) IV Push every 8 hours  morphine  - Injectable 1 milliGRAM(s) IV Push once  simvastatin 20 milliGRAM(s) Oral at bedtime  vancomycin  IVPB      vancomycin  IVPB 1000 milliGRAM(s) IV Intermittent every 12 hours      MEDICATIONS  (PRN):  acetaminophen   Tablet .. 650 milliGRAM(s) Oral every 6 hours PRN Temp greater or equal to 38C (100.4F), Mild Pain (1 - 3)  morphine  - Injectable 1 milliGRAM(s) IV Push every 1 hour PRN severe chest pain  nitroglycerin     SubLingual 0.4 milliGRAM(s) SubLingual every 5 minutes PRN Chest Pain  ondansetron Injectable 4 milliGRAM(s) IV Push every 6 hours PRN Nausea and/or Vomiting

## 2018-09-22 NOTE — PROGRESS NOTE ADULT - ASSESSMENT
Assessment and Plan:  1) Acute Hypoxic Respiratory failure - severe PNA. SOB improved.  - continue IV Vanco and Cefepime  - Solumedrol 60mg q12. taper according to clinical condition  - Vanco trough  - f/up blood, sputum, urine cxs  - urine legionella Neg  - strep ag, mycoplasma ab  ---- D/C diuretics. if BP drops, IVF and f/u BNP      2. elevated D- dimer: CT chest showed no PE  - per Pulm, right heart strain CT chest is likely artifact  - continue oxygenation supplement via nc at 4l/minute with pulse ox monitoring    2) Acute Lactic acidosis -due to hypoxia- resolved     3) Physical deconditioning with profound generalized body weakness  - supportive tx.  - Zofran PRN for nausea    4) Mild abdominal discomfort on admission  -CT abdomen and pelvis- no acute pathology    5) Atypical chest pain- most likely pleuritic related to pneumonia   trops negative, no significant changes on 12 lead EKG, will d/c tele monitoring    6) GI and DVT proph.

## 2018-09-23 LAB
ANION GAP SERPL CALC-SCNC: 13 MMOL/L — SIGNIFICANT CHANGE UP (ref 7–14)
BASOPHILS # BLD AUTO: 0.01 K/UL — SIGNIFICANT CHANGE UP (ref 0–0.2)
BASOPHILS NFR BLD AUTO: 0.1 % — SIGNIFICANT CHANGE UP (ref 0–1)
BUN SERPL-MCNC: 10 MG/DL — SIGNIFICANT CHANGE UP (ref 10–20)
CALCIUM SERPL-MCNC: 9.1 MG/DL — SIGNIFICANT CHANGE UP (ref 8.5–10.1)
CHLORIDE SERPL-SCNC: 102 MMOL/L — SIGNIFICANT CHANGE UP (ref 98–110)
CO2 SERPL-SCNC: 27 MMOL/L — SIGNIFICANT CHANGE UP (ref 17–32)
CREAT SERPL-MCNC: 0.6 MG/DL — LOW (ref 0.7–1.5)
EOSINOPHIL # BLD AUTO: 0 K/UL — SIGNIFICANT CHANGE UP (ref 0–0.7)
EOSINOPHIL NFR BLD AUTO: 0 % — SIGNIFICANT CHANGE UP (ref 0–8)
GLUCOSE BLDC GLUCOMTR-MCNC: 153 MG/DL — HIGH (ref 70–99)
GLUCOSE BLDC GLUCOMTR-MCNC: 182 MG/DL — HIGH (ref 70–99)
GLUCOSE BLDC GLUCOMTR-MCNC: 259 MG/DL — HIGH (ref 70–99)
GLUCOSE SERPL-MCNC: 154 MG/DL — HIGH (ref 70–99)
HCT VFR BLD CALC: 34.3 % — LOW (ref 37–47)
HGB BLD-MCNC: 11.7 G/DL — LOW (ref 12–16)
IMM GRANULOCYTES NFR BLD AUTO: 0.3 % — SIGNIFICANT CHANGE UP (ref 0.1–0.3)
LYMPHOCYTES # BLD AUTO: 1.26 K/UL — SIGNIFICANT CHANGE UP (ref 1.2–3.4)
LYMPHOCYTES # BLD AUTO: 13.9 % — LOW (ref 20.5–51.1)
MCHC RBC-ENTMCNC: 29.3 PG — SIGNIFICANT CHANGE UP (ref 27–31)
MCHC RBC-ENTMCNC: 34.1 G/DL — SIGNIFICANT CHANGE UP (ref 32–37)
MCV RBC AUTO: 86 FL — SIGNIFICANT CHANGE UP (ref 81–99)
MONOCYTES # BLD AUTO: 0.55 K/UL — SIGNIFICANT CHANGE UP (ref 0.1–0.6)
MONOCYTES NFR BLD AUTO: 6.1 % — SIGNIFICANT CHANGE UP (ref 1.7–9.3)
NEUTROPHILS # BLD AUTO: 7.23 K/UL — HIGH (ref 1.4–6.5)
NEUTROPHILS NFR BLD AUTO: 79.6 % — HIGH (ref 42.2–75.2)
PLATELET # BLD AUTO: 176 K/UL — SIGNIFICANT CHANGE UP (ref 130–400)
POTASSIUM SERPL-MCNC: 4.3 MMOL/L — SIGNIFICANT CHANGE UP (ref 3.5–5)
POTASSIUM SERPL-SCNC: 4.3 MMOL/L — SIGNIFICANT CHANGE UP (ref 3.5–5)
RBC # BLD: 3.99 M/UL — LOW (ref 4.2–5.4)
RBC # FLD: 13.1 % — SIGNIFICANT CHANGE UP (ref 11.5–14.5)
SODIUM SERPL-SCNC: 142 MMOL/L — SIGNIFICANT CHANGE UP (ref 135–146)
VANCOMYCIN TROUGH SERPL-MCNC: 58.2 UG/ML — HIGH (ref 5–10)
WBC # BLD: 9.08 K/UL — SIGNIFICANT CHANGE UP (ref 4.8–10.8)
WBC # FLD AUTO: 9.08 K/UL — SIGNIFICANT CHANGE UP (ref 4.8–10.8)

## 2018-09-23 RX ORDER — ALPRAZOLAM 0.25 MG
0.25 TABLET ORAL ONCE
Qty: 0 | Refills: 0 | Status: DISCONTINUED | OUTPATIENT
Start: 2018-09-23 | End: 2018-09-24

## 2018-09-23 RX ADMIN — Medication 3 MILLILITER(S): at 07:33

## 2018-09-23 RX ADMIN — CEFEPIME 100 MILLIGRAM(S): 1 INJECTION, POWDER, FOR SOLUTION INTRAMUSCULAR; INTRAVENOUS at 05:25

## 2018-09-23 RX ADMIN — FAMOTIDINE 20 MILLIGRAM(S): 10 INJECTION INTRAVENOUS at 17:29

## 2018-09-23 RX ADMIN — SIMVASTATIN 20 MILLIGRAM(S): 20 TABLET, FILM COATED ORAL at 23:30

## 2018-09-23 RX ADMIN — Medication 40 MILLIGRAM(S): at 17:29

## 2018-09-23 RX ADMIN — Medication 250 MILLIGRAM(S): at 05:41

## 2018-09-23 RX ADMIN — Medication 3 MILLILITER(S): at 19:08

## 2018-09-23 RX ADMIN — Medication 60 MILLIGRAM(S): at 05:24

## 2018-09-23 RX ADMIN — Medication 3 MILLILITER(S): at 13:54

## 2018-09-23 RX ADMIN — Medication 81 MILLIGRAM(S): at 12:49

## 2018-09-23 RX ADMIN — Medication 250 MILLIGRAM(S): at 17:28

## 2018-09-23 RX ADMIN — Medication 100 MILLIGRAM(S): at 05:24

## 2018-09-23 RX ADMIN — FAMOTIDINE 20 MILLIGRAM(S): 10 INJECTION INTRAVENOUS at 05:24

## 2018-09-23 RX ADMIN — HEPARIN SODIUM 5000 UNIT(S): 5000 INJECTION INTRAVENOUS; SUBCUTANEOUS at 17:28

## 2018-09-23 RX ADMIN — HEPARIN SODIUM 5000 UNIT(S): 5000 INJECTION INTRAVENOUS; SUBCUTANEOUS at 05:24

## 2018-09-23 RX ADMIN — CEFEPIME 100 MILLIGRAM(S): 1 INJECTION, POWDER, FOR SOLUTION INTRAMUSCULAR; INTRAVENOUS at 17:29

## 2018-09-23 NOTE — PROGRESS NOTE ADULT - SUBJECTIVE AND OBJECTIVE BOX
MAGDALENA MICHAUD  [address] ([address]-ICU)            Patient was evaluated and examined  by bedside, clinically patient feels better less cough, pleuritic chest pain has subsided, less oxygen requirement         REVIEW OF SYSTEMS:  please see pertinent positives mentioned above, all other 12 ROS negative      T(C): , Max: 36.7 (09-23-18 @ 11:00)  HR: 64 (09-23-18 @ 11:00)  BP: 116/56 (09-23-18 @ 11:00)  RR: 22 (09-23-18 @ 11:00)  SpO2: 96% (09-23-18 @ 11:00)  CAPILLARY BLOOD GLUCOSE      POCT Blood Glucose.: 259 mg/dL (23 Sep 2018 13:57)  POCT Blood Glucose.: 153 mg/dL (23 Sep 2018 05:30)  POCT Blood Glucose.: 222 mg/dL (22 Sep 2018 18:50)      PHYSICAL EXAM:  General: NAD, AAOX3, patient is laying comfortably in bed  HEENT: AT, NC, Supple, NO JVD, NO CB  Lungs: good breath sounds B/L, no wheezing, no rhonchi  CVS: normal S1, S2, RRR, NO M/G/R  Abdomen: soft, bowel sounds present, non-tender, non-distended  Extremities: no edema, no clubbing, no cyanosis, positive peripheral pulses b/l  Neuro: no acute focal neurological deficits, generalized body weakness  Skin: no rush, no ecchymosis      LAB  CBC  Date: 09-23-18 @ 05:23  Mean cell Beghbulqzf08.3  Mean cell Hemoglobin Conc34.1  Mean cell Volum 86.0  Platelet count-Automate 176  RBC Count 3.99  Red Cell Distrib Width13.1  WBC Count9.08  % Albumin, Urine--  Hematocrit 34.3  Hemoglobin 11.7  CBC  Date: 09-22-18 @ 06:35  Mean cell Mislazhhip52.8  Mean cell Hemoglobin Conc34.4  Mean cell Volum 86.6  Platelet count-Automate 130  RBC Count 4.26  Red Cell Distrib Width13.2  WBC Count3.24  % Albumin, Urine--  Hematocrit 36.9  Hemoglobin 12.7  CBC  Date: 09-21-18 @ 21:57  Mean cell Adxfzwzupf54.4  Mean cell Hemoglobin Conc33.9  Mean cell Volum 86.7  Platelet count-Automate 135  RBC Count 4.15  Red Cell Distrib Width13.4  WBC Count5.58  % Albumin, Urine--  Hematocrit 36.0  Hemoglobin 12.2  CBC  Date: 09-21-18 @ 06:45  Mean cell Wowdclmibk36.2  Mean cell Hemoglobin Conc32.7  Mean cell Volum 89.3  Platelet count-Automate 133  RBC Count 3.56  Red Cell Distrib Width13.9  WBC Count2.91  % Albumin, Urine--  Hematocrit 31.8  Hemoglobin 10.4  CBC  Date: 09-20-18 @ 00:15  Mean cell Omdmroyefw40.7  Mean cell Hemoglobin Conc34.6  Mean cell Volum 85.9  Platelet count-Automate 196  RBC Count 4.34  Red Cell Distrib Width13.5  WBC Count5.66  % Albumin, Urine--  Hematocrit 37.3  Hemoglobin 12.9    [facility]  09-23-18 @ 05:23  Blood Gas Arterial-Calcium,Ionized--  Blood Urea Nitrogen, Serum 10 mg/dL [10 - 20]  Carbon Dioxide, Serum27 mmol/L [17 - 32]  Chloride, Fycju471 mmol/L [98 - 110]  Creatinie, Serum0.6 mg/dL<L> [0.7 - 1.5]  Glucose, Ekyvd472 mg/dL<H> [70 - 99]  Potassium, Serum4.3 mmol/L [3.5 - 5.0]  Sodium, Serum 142 mmol/L [135 - 146]  [facility]  09-22-18 @ 06:35  Blood Gas Arterial-Calcium,Ionized--  Blood Urea Nitrogen, Serum 9 mg/dL<L> [10 - 20]  Carbon Dioxide, Serum26 mmol/L [17 - 32]  Chloride, Zbcpb529 mmol/L [98 - 110]  Creatinie, Serum0.5 mg/dL<L> [0.7 - 1.5]  Glucose, Faysy450 mg/dL<H> [70 - 99]  Potassium, Serum3.5 mmol/L [3.5 - 5.0]  Sodium, Serum 142 mmol/L [135 - 146]  [facility]  09-21-18 @ 06:45  Blood Gas Arterial-Calcium,Ionized--  Blood Urea Nitrogen, Serum 13 mg/dL [10 - 20]  Carbon Dioxide, Serum25 mmol/L [17 - 32]  Chloride, Serum98 mmol/L [98 - 110]  Creatinie, Serum0.8 mg/dL [0.7 - 1.5]  Glucose, Insar692 mg/dL<H> [70 - 99]  Potassium, Serum3.7 mmol/L [3.5 - 5.0]  Sodium, Serum 134 mmol/L<L> [135 - 146]  [facility]MP  09-20-18 @ 00:15  Blood Gas Arterial-Calcium,Ionized--  Blood Urea Nitrogen, Serum 17 mg/dL [10 - 20]  Carbon Dioxide, Serum22 mmol/L [17 - 32]  Chloride, Serum98 mmol/L [98 - 110]  Creatinie, Serum0.8 mg/dL [0.7 - 1.5]  Glucose, Serum96 mg/dL [70 - 99]  Potassium, Serum4.1 mmol/L [3.5 - 5.0] [Hemolyzed. Interpret with caution]  Sodium, Serum 137 mmol/L [135 - 146]              Microbiology:    Culture - Blood (collected 09-21-18 @ 12:27)  Source: .Blood Blood-Peripheral  Preliminary Report (09-23-18 @ 01:01):    No growth to date.    Culture - Urine (collected 09-21-18 @ 11:07)  Source: .Urine Clean Catch (Midstream)  Final Report (09-22-18 @ 22:34):    No growth        Medications:  acetaminophen   Tablet .. 650 milliGRAM(s) Oral every 6 hours PRN  ALBUTerol/ipratropium for Nebulization 3 milliLiter(s) Nebulizer every 6 hours  aspirin enteric coated 81 milliGRAM(s) Oral daily  cefepime   IVPB      cefepime   IVPB 1000 milliGRAM(s) IV Intermittent every 12 hours  docusate sodium 100 milliGRAM(s) Oral two times a day  famotidine    Tablet 20 milliGRAM(s) Oral two times a day  heparin  Injectable 5000 Unit(s) SubCutaneous every 12 hours  influenza   Vaccine 0.5 milliLiter(s) IntraMuscular once  methylPREDNISolone sodium succinate Injectable 60 milliGRAM(s) IV Push every 12 hours  morphine  - Injectable 1 milliGRAM(s) IV Push once  morphine  - Injectable 1 milliGRAM(s) IV Push every 1 hour PRN  ondansetron Injectable 4 milliGRAM(s) IV Push every 6 hours PRN  simvastatin 20 milliGRAM(s) Oral at bedtime  vancomycin  IVPB      vancomycin  IVPB 1000 milliGRAM(s) IV Intermittent every 12 hours          Assessment and Plan:  1) Acute Hypoxic Respiratory failure - severe PNA. SOB improved.  - continue IV Vanco and Cefepime  - taper down Solumedrol to 40mg q12. taper according to clinical condition  - Vanco trough  - blood, sputum, urine cxs -negative  - urine legionella Neg  - strep ag, mycoplasma ab        2. elevated D- dimer: CT chest showed no PE  - per Pulm, right heart strain CT chest is likely artifact  - continue oxygenation supplement via nc at 2-3l/minute with pulse ox monitoring    2) Acute Lactic acidosis -due to hypoxia- resolved     3) Physical deconditioning with profound generalized body weakness  - supportive tx.  - Zofran PRN for nausea    4) Mild abdominal discomfort on admission- resolved  -CT abdomen and pelvis- no acute pathology    5) Atypical chest pain- most likely pleuritic related to pneumonia   trops negative, no significant changes on 12 lead EKG,  d/c tele monitoring    6) GI and DVT proph.    Patient can be downgraded to medical floor

## 2018-09-23 NOTE — PROGRESS NOTE ADULT - SUBJECTIVE AND OBJECTIVE BOX
Patient is a 78y old  Female who presents with a chief complaint of pneumonia (23 Sep 2018 15:16)      T(F): 97.1 (09-23-18 @ 19:16), Max: 98 (09-23-18 @ 11:00)  HR: 59 (09-23-18 @ 19:00)  BP: 149/70 (09-23-18 @ 19:00)  RR: 18 (09-23-18 @ 19:16)  SpO2: 97% (09-23-18 @ 19:00) (94% - 97%)    PHYSICAL EXAM:  GENERAL: NAD, well-groomed, well-developed  HEAD:  Atraumatic, Normocephalic  EYES: EOMI, PERRLA, conjunctiva and sclera clear  ENMT: No tonsillar erythema, exudates, or enlargement; Moist mucous membranes, Good dentition, No lesions  NECK: Supple, No JVD, Normal thyroid  NERVOUS SYSTEM:  Alert & Oriented X3, Good concentration; Motor Strength 5/5 B/L upper and lower extremities; DTRs 2+ intact and symmetric  CHEST/LUNG: Clear to percussion bilaterally; No rales, rhonchi, wheezing, or rubs  HEART: Regular rate and rhythm; No murmurs, rubs, or gallops  ABDOMEN: Soft, Nontender, Nondistended; Bowel sounds present  EXTREMITIES:  2+ Peripheral Pulses, No clubbing, cyanosis, or edema  LYMPH: No lymphadenopathy noted  SKIN: No rashes or lesions    labs  09-23    142  |  102  |  10  ----------------------------<  154<H>  4.3   |  27  |  0.6<L>    Ca    9.1      23 Sep 2018 05:23    TPro  6.4  /  Alb  3.9  /  TBili  0.4  /  DBili  x   /  AST  19  /  ALT  13  /  AlkPhos  43  09-22                          11.7   9.08  )-----------( 176      ( 23 Sep 2018 05:23 )             34.3         Culture - Blood (collected 21 Sep 2018 21:57)  Source: .Blood None  Preliminary Report (23 Sep 2018 17:00):    No growth to date.    Culture - Blood (collected 21 Sep 2018 12:27)  Source: .Blood Blood-Peripheral  Preliminary Report (23 Sep 2018 01:01):    No growth to date.    Culture - Urine (collected 21 Sep 2018 11:07)  Source: .Urine Clean Catch (Midstream)  Final Report (22 Sep 2018 22:34):    No growth            radiology    acetaminophen   Tablet .. 650 milliGRAM(s) Oral every 6 hours PRN  ALBUTerol/ipratropium for Nebulization 3 milliLiter(s) Nebulizer every 6 hours  ALPRAZolam 0.25 milliGRAM(s) Oral once  aspirin enteric coated 81 milliGRAM(s) Oral daily  cefepime   IVPB      cefepime   IVPB 1000 milliGRAM(s) IV Intermittent every 12 hours  docusate sodium 100 milliGRAM(s) Oral two times a day  famotidine    Tablet 20 milliGRAM(s) Oral two times a day  heparin  Injectable 5000 Unit(s) SubCutaneous every 12 hours  influenza   Vaccine 0.5 milliLiter(s) IntraMuscular once  methylPREDNISolone sodium succinate Injectable 40 milliGRAM(s) IV Push two times a day  morphine  - Injectable 1 milliGRAM(s) IV Push once  morphine  - Injectable 1 milliGRAM(s) IV Push every 1 hour PRN  ondansetron Injectable 4 milliGRAM(s) IV Push every 6 hours PRN  simvastatin 20 milliGRAM(s) Oral at bedtime  vancomycin  IVPB      vancomycin  IVPB 1000 milliGRAM(s) IV Intermittent every 12 hours

## 2018-09-24 LAB
ANION GAP SERPL CALC-SCNC: 13 MMOL/L — SIGNIFICANT CHANGE UP (ref 7–14)
BUN SERPL-MCNC: 13 MG/DL — SIGNIFICANT CHANGE UP (ref 10–20)
CALCIUM SERPL-MCNC: 8.9 MG/DL — SIGNIFICANT CHANGE UP (ref 8.5–10.1)
CHLORIDE SERPL-SCNC: 103 MMOL/L — SIGNIFICANT CHANGE UP (ref 98–110)
CO2 SERPL-SCNC: 26 MMOL/L — SIGNIFICANT CHANGE UP (ref 17–32)
CREAT SERPL-MCNC: 0.6 MG/DL — LOW (ref 0.7–1.5)
GLUCOSE BLDC GLUCOMTR-MCNC: 127 MG/DL — HIGH (ref 70–99)
GLUCOSE BLDC GLUCOMTR-MCNC: 137 MG/DL — HIGH (ref 70–99)
GLUCOSE BLDC GLUCOMTR-MCNC: 177 MG/DL — HIGH (ref 70–99)
GLUCOSE BLDC GLUCOMTR-MCNC: 193 MG/DL — HIGH (ref 70–99)
GLUCOSE SERPL-MCNC: 120 MG/DL — HIGH (ref 70–99)
HCT VFR BLD CALC: 32.6 % — LOW (ref 37–47)
HGB BLD-MCNC: 10.9 G/DL — LOW (ref 12–16)
MCHC RBC-ENTMCNC: 29.3 PG — SIGNIFICANT CHANGE UP (ref 27–31)
MCHC RBC-ENTMCNC: 33.4 G/DL — SIGNIFICANT CHANGE UP (ref 32–37)
MCV RBC AUTO: 87.6 FL — SIGNIFICANT CHANGE UP (ref 81–99)
NRBC # BLD: 0 /100 WBCS — SIGNIFICANT CHANGE UP (ref 0–0)
NT-PROBNP SERPL-SCNC: 1076 PG/ML — HIGH (ref 0–300)
PLATELET # BLD AUTO: 199 K/UL — SIGNIFICANT CHANGE UP (ref 130–400)
POTASSIUM SERPL-MCNC: 4.9 MMOL/L — SIGNIFICANT CHANGE UP (ref 3.5–5)
POTASSIUM SERPL-SCNC: 4.9 MMOL/L — SIGNIFICANT CHANGE UP (ref 3.5–5)
RBC # BLD: 3.72 M/UL — LOW (ref 4.2–5.4)
RBC # FLD: 13.5 % — SIGNIFICANT CHANGE UP (ref 11.5–14.5)
S PNEUM AG UR QL: NEGATIVE — SIGNIFICANT CHANGE UP
SODIUM SERPL-SCNC: 142 MMOL/L — SIGNIFICANT CHANGE UP (ref 135–146)
WBC # BLD: 11.04 K/UL — HIGH (ref 4.8–10.8)
WBC # FLD AUTO: 11.04 K/UL — HIGH (ref 4.8–10.8)

## 2018-09-24 RX ADMIN — Medication 250 MILLIGRAM(S): at 05:24

## 2018-09-24 RX ADMIN — Medication 0.25 MILLIGRAM(S): at 21:38

## 2018-09-24 RX ADMIN — Medication 3 MILLILITER(S): at 07:35

## 2018-09-24 RX ADMIN — HEPARIN SODIUM 5000 UNIT(S): 5000 INJECTION INTRAVENOUS; SUBCUTANEOUS at 17:05

## 2018-09-24 RX ADMIN — Medication 650 MILLIGRAM(S): at 21:40

## 2018-09-24 RX ADMIN — CEFEPIME 100 MILLIGRAM(S): 1 INJECTION, POWDER, FOR SOLUTION INTRAMUSCULAR; INTRAVENOUS at 17:04

## 2018-09-24 RX ADMIN — Medication 81 MILLIGRAM(S): at 12:10

## 2018-09-24 RX ADMIN — Medication 3 MILLILITER(S): at 13:39

## 2018-09-24 RX ADMIN — HEPARIN SODIUM 5000 UNIT(S): 5000 INJECTION INTRAVENOUS; SUBCUTANEOUS at 05:23

## 2018-09-24 RX ADMIN — Medication 40 MILLIGRAM(S): at 05:23

## 2018-09-24 RX ADMIN — FAMOTIDINE 20 MILLIGRAM(S): 10 INJECTION INTRAVENOUS at 17:05

## 2018-09-24 RX ADMIN — Medication 3 MILLILITER(S): at 19:47

## 2018-09-24 RX ADMIN — CEFEPIME 100 MILLIGRAM(S): 1 INJECTION, POWDER, FOR SOLUTION INTRAMUSCULAR; INTRAVENOUS at 05:23

## 2018-09-24 RX ADMIN — SIMVASTATIN 20 MILLIGRAM(S): 20 TABLET, FILM COATED ORAL at 21:44

## 2018-09-24 RX ADMIN — FAMOTIDINE 20 MILLIGRAM(S): 10 INJECTION INTRAVENOUS at 05:23

## 2018-09-24 NOTE — PROGRESS NOTE ADULT - ASSESSMENT
IMPRESSION:  PNA  gram negative ??   fluid over load     PLAN:    CNS: no sedation     HEENT: oral care     PULMONARY: keep on oxygen keep pox > 92 % \  switch to prednisone 60 mg d/c solumderol     CARDIOVASCULAR: keep is < os give lasix 40 mg once   check BNP, echo   GI: GI prophylaxis.  Feeding     RENAL: follow lytes     INFECTIOUS DISEASE: d/c vanco  start levaquine possible d/c cefepime sher     HEMATOLOGICAL:  DVT prophylaxis.    ENDOCRINE:  Follow up FS.  Insulin protocol if needed.    downgrade to tele

## 2018-09-24 NOTE — PHARMACOTHERAPY INTERVENTION NOTE - COMMENTS
New order written for prednisone 60mg po daily, prior order for methylprednisolone still active.  Recommended to Dr. Sandoval that she discontinue methylprednisolone.

## 2018-09-24 NOTE — PROGRESS NOTE ADULT - ATTENDING COMMENTS
Patient was evaluated and examined by bedside independently, hypoxia has improved, no dyspnea at rest , decreased pleuritic chest pain, tolerating diet well.    All labs, radiology studies, VS was reviewed  I agree with medical plan outlined by Medical resident as stated above.  downgrade patient to medical floor
Patient was evaluated and examined by bedside independently, c/o chest pain during cough, tolerating diet well. remains hypoxic requiring oxygen supplement  All labs, radiology studies, VS was reviewed  I agree with medical plan outlined by Medical resident as stated above.

## 2018-09-24 NOTE — PROGRESS NOTE ADULT - ASSESSMENT
77yo female presents to the ER with 1 day of shortness of breath associated with "slight" cough, fever, chest pain, dizziness and weakness.    Acute Hypoxic Respiratory failure / PNA  - nebs, o2 prn,  steroids (on solumedrol q12,  taper on improvement)  - abx:  vanco + cefepime  - labx:Vanco trough  - blood, sputum, urine cxs -negative  - urine legionella Neg  - strep ag, mycoplasma ab    Hyperlipidemia - cw statin    DVT PPX  DISPO - dg to medical floor

## 2018-09-24 NOTE — PROGRESS NOTE ADULT - SUBJECTIVE AND OBJECTIVE BOX
MAGDALENA MICHAUD, female, 78y ([birth year]),   MRN-[id number]  Admit Date: 09-20 (4d)  CC:      HPI:  79yo female presents to the ER with 1 day of shortness of breath associated with "slight" cough, fever, chest pain, dizziness and weakness. She also notes an "upset" stomach. No pain levels mentioned. Patient took advil without relief so she came to the ER. Separately patient reports that she is urinating well. There was report of pulse ox oxygen level of 88% (20 Sep 2018 05:00)      INTERVAL HISTORY:   no fevers overnight.    PMH/PSH  PAST MEDICAL & SURGICAL HISTORY:  Hyperlipidemia  Hypertension, unspecified type  No significant past surgical history      ACTIVE MEDS:  ALBUTerol/ipratropium for Nebulization 3 milliLiter(s) Nebulizer every 6 hours  ALPRAZolam 0.25 milliGRAM(s) Oral once  aspirin enteric coated 81 milliGRAM(s) Oral daily  cefepime   IVPB      cefepime   IVPB 1000 milliGRAM(s) IV Intermittent every 12 hours  docusate sodium 100 milliGRAM(s) Oral two times a day  famotidine    Tablet 20 milliGRAM(s) Oral two times a day  heparin  Injectable 5000 Unit(s) SubCutaneous every 12 hours  methylPREDNISolone sodium succinate Injectable 40 milliGRAM(s) IV Push two times a day  morphine  - Injectable 1 milliGRAM(s) IV Push once  simvastatin 20 milliGRAM(s) Oral at bedtime  vancomycin  IVPB      vancomycin  IVPB 1000 milliGRAM(s) IV Intermittent every 12 hours    MEDICATIONS  (PRN):  acetaminophen   Tablet .. 650 milliGRAM(s) Oral every 6 hours PRN Temp greater or equal to 38C (100.4F), Mild Pain (1 - 3)  morphine  - Injectable 1 milliGRAM(s) IV Push every 1 hour PRN severe chest pain  ondansetron Injectable 4 milliGRAM(s) IV Push every 6 hours PRN Nausea and/or Vomiting      ================================================================          VS:  T(F): 97.5 (09-23 @ 23:48), Max: 98 (09-23 @ 11:00)  HR: 48 (09-24 @ 05:30)  BP: 145/69 (09-24 @ 05:30)  RR: 18 (09-23 @ 19:16)  SpO2: 95% (09-24 @ 05:30)      POCT Blood Glucose.: 182 mg/dL (23 Sep 2018 20:17)  POCT Blood Glucose.: 259 mg/dL (23 Sep 2018 13:57)        LABS/RAD/MICRO:    (09-23)    142  |  102  |  10  ----------------------------<  154<H>  4.3   |  27  |  0.6<L>    gfr (afr) 101  gfr (non-afr) 87          09-21 @ 18:11TROP: <0.01 ng/mL  CKMB: x      09-20 @ 14:23TROP: <0.01 ng/mL  CKMB: x      09-20 @ 00:15TROP: <0.01 ng/mL  CKMB: x          PRO-BNP:      (collected 09-21)  Source: .Blood None  Preliminary Report:    No growth to date.     (collected 09-21)  Source: .Blood Blood-Peripheral  Preliminary Report:    No growth to date.     (collected 09-21)  Source: .Urine Clean Catch (Midstream)  Final Report:    No growth

## 2018-09-24 NOTE — PROGRESS NOTE ADULT - SUBJECTIVE AND OBJECTIVE BOX
Patient is a 78y old  Female who presents with a chief complaint of pneumonia (24 Sep 2018 07:11)      Over Night Events:  Patient seen and examined feel better mild sob pox 89-90%on RA at rest       ROS:  See HPI    PHYSICAL EXAM    ICU Vital Signs Last 24 Hrs  T(C): 35.8 (24 Sep 2018 07:26), Max: 36.7 (23 Sep 2018 11:00)  T(F): 96.5 (24 Sep 2018 07:26), Max: 98 (23 Sep 2018 11:00)  HR: 52 (24 Sep 2018 07:26) (48 - 86)  BP: 158/70 (24 Sep 2018 07:26) (111/55 - 158/70)  BP(mean): 100 (24 Sep 2018 07:26) (79 - 100)  ABP: --  ABP(mean): --  RR: 18 (23 Sep 2018 19:16) (18 - 22)  SpO2: 96% (24 Sep 2018 07:26) (94% - 97%)      General: Aox3   HEENT:   Nell             Lymph Nodes: NO cervical LN   Lungs: Bilateral crackles bibasialr   Cardiovascular: Regular   Abdomen: Soft, Positive BS  Extremities: No clubbing   Skin: warm  Neurological: no motor deficit   Musculoskeletal: move all ext     I&O's Detail    23 Sep 2018 07:01  -  24 Sep 2018 07:00  --------------------------------------------------------  IN:    IV PiggyBack: 300 mL    Oral Fluid: 890 mL    Solution: 300 mL  Total IN: 1490 mL    OUT:  Total OUT: 0 mL    Total NET: 1490 mL          LABS:                          10.9   11.04 )-----------( 199      ( 24 Sep 2018 05:29 )             32.6         24 Sep 2018 05:29    142    |  103    |  13     ----------------------------<  120    4.9     |  26     |  0.6      Ca    8.9        24 Sep 2018 05:29                                                                                                                                                  Culture - Blood (collected 21 Sep 2018 21:57)  Source: .Blood None  Preliminary Report (23 Sep 2018 17:00):    No growth to date.    Culture - Blood (collected 21 Sep 2018 12:27)  Source: .Blood Blood-Peripheral  Preliminary Report (23 Sep 2018 01:01):    No growth to date.    Culture - Urine (collected 21 Sep 2018 11:07)  Source: .Urine Clean Catch (Midstream)  Final Report (22 Sep 2018 22:34):    No growth                                                                                           MEDICATIONS  (STANDING):  ALBUTerol/ipratropium for Nebulization 3 milliLiter(s) Nebulizer every 6 hours  ALPRAZolam 0.25 milliGRAM(s) Oral once  aspirin enteric coated 81 milliGRAM(s) Oral daily  cefepime   IVPB      cefepime   IVPB 1000 milliGRAM(s) IV Intermittent every 12 hours  docusate sodium 100 milliGRAM(s) Oral two times a day  famotidine    Tablet 20 milliGRAM(s) Oral two times a day  heparin  Injectable 5000 Unit(s) SubCutaneous every 12 hours  influenza   Vaccine 0.5 milliLiter(s) IntraMuscular once  methylPREDNISolone sodium succinate Injectable 40 milliGRAM(s) IV Push two times a day  morphine  - Injectable 1 milliGRAM(s) IV Push once  simvastatin 20 milliGRAM(s) Oral at bedtime  vancomycin  IVPB      vancomycin  IVPB 1000 milliGRAM(s) IV Intermittent every 12 hours    MEDICATIONS  (PRN):  acetaminophen   Tablet .. 650 milliGRAM(s) Oral every 6 hours PRN Temp greater or equal to 38C (100.4F), Mild Pain (1 - 3)  morphine  - Injectable 1 milliGRAM(s) IV Push every 1 hour PRN severe chest pain  ondansetron Injectable 4 milliGRAM(s) IV Push every 6 hours PRN Nausea and/or Vomiting          Xrays:  TLC:  OG:  ET tube:                                                                                    b/l effusion opacity R>L   ECHO:

## 2018-09-25 LAB
ANION GAP SERPL CALC-SCNC: 12 MMOL/L — SIGNIFICANT CHANGE UP (ref 7–14)
BUN SERPL-MCNC: 12 MG/DL — SIGNIFICANT CHANGE UP (ref 10–20)
CALCIUM SERPL-MCNC: 8.8 MG/DL — SIGNIFICANT CHANGE UP (ref 8.5–10.1)
CHLORIDE SERPL-SCNC: 102 MMOL/L — SIGNIFICANT CHANGE UP (ref 98–110)
CO2 SERPL-SCNC: 29 MMOL/L — SIGNIFICANT CHANGE UP (ref 17–32)
CREAT SERPL-MCNC: 0.6 MG/DL — LOW (ref 0.7–1.5)
GLUCOSE BLDC GLUCOMTR-MCNC: 124 MG/DL — HIGH (ref 70–99)
GLUCOSE BLDC GLUCOMTR-MCNC: 146 MG/DL — HIGH (ref 70–99)
GLUCOSE BLDC GLUCOMTR-MCNC: 179 MG/DL — HIGH (ref 70–99)
GLUCOSE BLDC GLUCOMTR-MCNC: 98 MG/DL — SIGNIFICANT CHANGE UP (ref 70–99)
GLUCOSE SERPL-MCNC: 79 MG/DL — SIGNIFICANT CHANGE UP (ref 70–99)
HCT VFR BLD CALC: 32.7 % — LOW (ref 37–47)
HGB BLD-MCNC: 11 G/DL — LOW (ref 12–16)
MCHC RBC-ENTMCNC: 29.6 PG — SIGNIFICANT CHANGE UP (ref 27–31)
MCHC RBC-ENTMCNC: 33.6 G/DL — SIGNIFICANT CHANGE UP (ref 32–37)
MCV RBC AUTO: 87.9 FL — SIGNIFICANT CHANGE UP (ref 81–99)
NRBC # BLD: 0 /100 WBCS — SIGNIFICANT CHANGE UP (ref 0–0)
PLATELET # BLD AUTO: 208 K/UL — SIGNIFICANT CHANGE UP (ref 130–400)
POTASSIUM SERPL-MCNC: 3.7 MMOL/L — SIGNIFICANT CHANGE UP (ref 3.5–5)
POTASSIUM SERPL-SCNC: 3.7 MMOL/L — SIGNIFICANT CHANGE UP (ref 3.5–5)
RBC # BLD: 3.72 M/UL — LOW (ref 4.2–5.4)
RBC # FLD: 13.4 % — SIGNIFICANT CHANGE UP (ref 11.5–14.5)
SODIUM SERPL-SCNC: 143 MMOL/L — SIGNIFICANT CHANGE UP (ref 135–146)
WBC # BLD: 8.83 K/UL — SIGNIFICANT CHANGE UP (ref 4.8–10.8)
WBC # FLD AUTO: 8.83 K/UL — SIGNIFICANT CHANGE UP (ref 4.8–10.8)

## 2018-09-25 RX ORDER — AMLODIPINE BESYLATE 2.5 MG/1
2.5 TABLET ORAL ONCE
Qty: 0 | Refills: 0 | Status: COMPLETED | OUTPATIENT
Start: 2018-09-25 | End: 2018-09-25

## 2018-09-25 RX ADMIN — HEPARIN SODIUM 5000 UNIT(S): 5000 INJECTION INTRAVENOUS; SUBCUTANEOUS at 18:10

## 2018-09-25 RX ADMIN — AMLODIPINE BESYLATE 2.5 MILLIGRAM(S): 2.5 TABLET ORAL at 21:24

## 2018-09-25 RX ADMIN — Medication 3 MILLILITER(S): at 20:07

## 2018-09-25 RX ADMIN — Medication 3 MILLILITER(S): at 13:55

## 2018-09-25 RX ADMIN — SIMVASTATIN 20 MILLIGRAM(S): 20 TABLET, FILM COATED ORAL at 21:24

## 2018-09-25 RX ADMIN — INFLUENZA VIRUS VACCINE 0.5 MILLILITER(S): 15; 15; 15; 15 SUSPENSION INTRAMUSCULAR at 12:05

## 2018-09-25 RX ADMIN — Medication 100 MILLIGRAM(S): at 06:40

## 2018-09-25 RX ADMIN — Medication 60 MILLIGRAM(S): at 06:40

## 2018-09-25 RX ADMIN — FAMOTIDINE 20 MILLIGRAM(S): 10 INJECTION INTRAVENOUS at 18:09

## 2018-09-25 RX ADMIN — HEPARIN SODIUM 5000 UNIT(S): 5000 INJECTION INTRAVENOUS; SUBCUTANEOUS at 06:40

## 2018-09-25 RX ADMIN — CEFEPIME 100 MILLIGRAM(S): 1 INJECTION, POWDER, FOR SOLUTION INTRAMUSCULAR; INTRAVENOUS at 18:09

## 2018-09-25 RX ADMIN — Medication 100 MILLIGRAM(S): at 18:10

## 2018-09-25 RX ADMIN — Medication 81 MILLIGRAM(S): at 12:05

## 2018-09-25 RX ADMIN — Medication 3 MILLILITER(S): at 08:07

## 2018-09-25 RX ADMIN — CEFEPIME 100 MILLIGRAM(S): 1 INJECTION, POWDER, FOR SOLUTION INTRAMUSCULAR; INTRAVENOUS at 06:40

## 2018-09-25 RX ADMIN — FAMOTIDINE 20 MILLIGRAM(S): 10 INJECTION INTRAVENOUS at 06:40

## 2018-09-25 NOTE — PROGRESS NOTE ADULT - SUBJECTIVE AND OBJECTIVE BOX
JASWANTMAGDALENA  [address] ([address])            Patient was evaluated and examined  by bedside, feels better today, mild pleuritic chest pain, no fever, no hypoxia, tolerating diet well                REVIEW OF SYSTEMS:  please see pertinent positives mentioned above, all other 12 ROS negative      T(C): , Max: 35.7 (09-24-18 @ 18:25)  HR: 50 (09-25-18 @ 05:45)  BP: 127/71 (09-25-18 @ 05:45)  RR: 16 (09-25-18 @ 05:45)  SpO2: 94% (09-25-18 @ 09:18)  CAPILLARY BLOOD GLUCOSE      POCT Blood Glucose.: 98 mg/dL (25 Sep 2018 07:59)  POCT Blood Glucose.: 137 mg/dL (24 Sep 2018 17:41)  POCT Blood Glucose.: 193 mg/dL (24 Sep 2018 12:18)      PHYSICAL EXAM:  General: NAD, AAOX3, patient is laying comfortably in bed  HEENT: AT, NC, Supple, NO JVD, NO CB  Lungs: good breath sounds B/L, no wheezing, no rhonchi  CVS: normal S1, S2, RRR, NO M/G/R  Abdomen: soft, bowel sounds present, non-tender, non-distended  Extremities: no edema, no clubbing, no cyanosis, positive peripheral pulses b/l  Neuro: no acute focal neurological deficits  Skin: no rush, no ecchymosis      LAB  CBC  Date: 09-25-18 @ 08:55  Mean cell Ogfztpvbsh47.6  Mean cell Hemoglobin Conc33.6  Mean cell Volum 87.9  Platelet count-Automate 208  RBC Count 3.72  Red Cell Distrib Width13.4  WBC Count8.83  % Albumin, Urine--  Hematocrit 32.7  Hemoglobin 11.0  CBC  Date: 09-24-18 @ 05:29  Mean cell Vqsobjrvry26.3  Mean cell Hemoglobin Conc33.4  Mean cell Volum 87.6  Platelet count-Automate 199  RBC Count 3.72  Red Cell Distrib Width13.5  WBC Count11.04  % Albumin, Urine--  Hematocrit 32.6  Hemoglobin 10.9  CBC  Date: 09-23-18 @ 05:23  Mean cell Acrbcmhyxu15.3  Mean cell Hemoglobin Conc34.1  Mean cell Volum 86.0  Platelet count-Automate 176  RBC Count 3.99  Red Cell Distrib Width13.1  WBC Count9.08  % Albumin, Urine--  Hematocrit 34.3  Hemoglobin 11.7  CBC  Date: 09-22-18 @ 06:35  Mean cell Tetzqyywml82.8  Mean cell Hemoglobin Conc34.4  Mean cell Volum 86.6  Platelet count-Automate 130  RBC Count 4.26  Red Cell Distrib Width13.2  WBC Count3.24  % Albumin, Urine--  Hematocrit 36.9  Hemoglobin 12.7  CBC  Date: 09-21-18 @ 21:57  Mean cell Ivusfilali87.4  Mean cell Hemoglobin Conc33.9  Mean cell Volum 86.7  Platelet count-Automate 135  RBC Count 4.15  Red Cell Distrib Width13.4  WBC Count5.58  % Albumin, Urine--  Hematocrit 36.0  Hemoglobin 12.2  CBC  Date: 09-21-18 @ 06:45  Mean cell Albnhnsfgq36.2  Mean cell Hemoglobin Conc32.7  Mean cell Volum 89.3  Platelet count-Automate 133  RBC Count 3.56  Red Cell Distrib Width13.9  WBC Count2.91  % Albumin, Urine--  Hematocrit 31.8  Hemoglobin 10.4  CBC  Date: 09-20-18 @ 00:15  Mean cell Cpipzgrqfn37.7  Mean cell Hemoglobin Conc34.6  Mean cell Volum 85.9  Platelet count-Automate 196  RBC Count 4.34  Red Cell Distrib Width13.5  WBC Count5.66  % Albumin, Urine--  Hematocrit 37.3  Hemoglobin 12.9    [facility]  09-25-18 @ 08:55  Blood Gas Arterial-Calcium,Ionized--  Blood Urea Nitrogen, Serum 12 mg/dL [10 - 20]  Carbon Dioxide, Serum29 mmol/L [17 - 32]  Chloride, Buzfu616 mmol/L [98 - 110]  Creatinie, Serum0.6 mg/dL<L> [0.7 - 1.5]  Glucose, Serum79 mg/dL [70 - 99]  Potassium, Serum3.7 mmol/L [3.5 - 5.0]  Sodium, Serum 143 mmol/L [135 - 146]  [facility]  09-24-18 @ 05:29  Blood Gas Arterial-Calcium,Ionized--  Blood Urea Nitrogen, Serum 13 mg/dL [10 - 20]  Carbon Dioxide, Serum26 mmol/L [17 - 32]  Chloride, Amgrt591 mmol/L [98 - 110]  Creatinie, Serum0.6 mg/dL<L> [0.7 - 1.5]  Glucose, Szjcy757 mg/dL<H> [70 - 99]  Potassium, Serum4.9 mmol/L [3.5 - 5.0]  Sodium, Serum 142 mmol/L [135 - 146]  [facility]  09-23-18 @ 05:23  Blood Gas Arterial-Calcium,Ionized--  Blood Urea Nitrogen, Serum 10 mg/dL [10 - 20]  Carbon Dioxide, Serum27 mmol/L [17 - 32]  Chloride, Yqubc968 mmol/L [98 - 110]  Creatinie, Serum0.6 mg/dL<L> [0.7 - 1.5]  Glucose, Opmej986 mg/dL<H> [70 - 99]  Potassium, Serum4.3 mmol/L [3.5 - 5.0]  Sodium, Serum 142 mmol/L [135 - 146]  [facility]  09-22-18 @ 06:35  Blood Gas Arterial-Calcium,Ionized--  Blood Urea Nitrogen, Serum 9 mg/dL<L> [10 - 20]  Carbon Dioxide, Serum26 mmol/L [17 - 32]  Chloride, Smich477 mmol/L [98 - 110]  Creatinie, Serum0.5 mg/dL<L> [0.7 - 1.5]  Glucose, Pzvce015 mg/dL<H> [70 - 99]  Potassium, Serum3.5 mmol/L [3.5 - 5.0]  Sodium, Serum 142 mmol/L [135 - 146]  [facility]  09-21-18 @ 06:45  Blood Gas Arterial-Calcium,Ionized--  Blood Urea Nitrogen, Serum 13 mg/dL [10 - 20]  Carbon Dioxide, Serum25 mmol/L [17 - 32]  Chloride, Serum98 mmol/L [98 - 110]  Creatinie, Serum0.8 mg/dL [0.7 - 1.5]  Glucose, Mgsut133 mg/dL<H> [70 - 99]  Potassium, Serum3.7 mmol/L [3.5 - 5.0]  Sodium, Serum 134 mmol/L<L> [135 - 146]              Microbiology:    Culture - Blood (collected 09-21-18 @ 21:57)  Source: .Blood None  Preliminary Report (09-23-18 @ 17:00):    No growth to date.    Culture - Blood (collected 09-21-18 @ 12:27)  Source: .Blood Blood-Peripheral  Preliminary Report (09-23-18 @ 01:01):    No growth to date.    Culture - Urine (collected 09-21-18 @ 11:07)  Source: .Urine Clean Catch (Midstream)  Final Report (09-22-18 @ 22:34):    No growth          Medications:  acetaminophen   Tablet .. 650 milliGRAM(s) Oral every 6 hours PRN  ALBUTerol/ipratropium for Nebulization 3 milliLiter(s) Nebulizer every 6 hours  aspirin enteric coated 81 milliGRAM(s) Oral daily  cefepime   IVPB 1000 milliGRAM(s) IV Intermittent every 12 hours  cefepime   IVPB      docusate sodium 100 milliGRAM(s) Oral two times a day  famotidine    Tablet 20 milliGRAM(s) Oral two times a day  heparin  Injectable 5000 Unit(s) SubCutaneous every 12 hours  influenza   Vaccine 0.5 milliLiter(s) IntraMuscular once  levoFLOXacin IVPB 500 milliGRAM(s) IV Intermittent every 24 hours  morphine  - Injectable 1 milliGRAM(s) IV Push once  ondansetron Injectable 4 milliGRAM(s) IV Push every 6 hours PRN  predniSONE   Tablet 60 milliGRAM(s) Oral daily  simvastatin 20 milliGRAM(s) Oral at bedtime        Assessment and Plan:  1) Acute Hypoxic Respiratory failure - severe PNA. SOB resolved  - d/c  Vanco and will d/c Cefepime today  - taper down Solumedrol to prednisone tx. as per pulmonary recommendations  - blood, sputum, urine cxs -negative  - urine legionella Neg  - strep ag- negative  -continue Levaquin tx.        2. elevated D- dimer: CT chest showed no PE  - per Pulm, right heart strain CT chest is likely artifact  - continue oxygenation supplement via nc at 2-3l/minute with pulse ox monitoring    2) Acute Lactic acidosis -due to hypoxia- resolved     3) Physical deconditioning with profound generalized body weakness  - supportive tx.  - Zofran PRN for nausea    4) Mild abdominal discomfort on admission- resolved  -CT abdomen and pelvis- no acute pathology    5) Atypical chest pain- most likely pleuritic related to pneumonia   trops negative, no significant changes on 12 lead EKG,   -2d echo completed with copy in chart- LVEF more than 55%, no significant valvular heart disease.      6) GI and DVT proph.    D/C PLAN: Patient is for possible d/c home in 24 to 48 hours, assess patient's oxygen during ambulation

## 2018-09-25 NOTE — PROGRESS NOTE ADULT - SUBJECTIVE AND OBJECTIVE BOX
blood pressure is high, will order 1 dose of norvasc blood pressure is high, will order norvasc (not lopressor due to low heart rate)

## 2018-09-25 NOTE — PROGRESS NOTE ADULT - NSHPATTENDINGPLANDISCUSS_GEN_ALL_CORE
patient, covering nurse
patient, covering nurse
patient. patient's son
patient
patient, patient's family

## 2018-09-26 LAB
ANION GAP SERPL CALC-SCNC: 14 MMOL/L — SIGNIFICANT CHANGE UP (ref 7–14)
BUN SERPL-MCNC: 9 MG/DL — LOW (ref 10–20)
CALCIUM SERPL-MCNC: 9.1 MG/DL — SIGNIFICANT CHANGE UP (ref 8.5–10.1)
CHLORIDE SERPL-SCNC: 102 MMOL/L — SIGNIFICANT CHANGE UP (ref 98–110)
CO2 SERPL-SCNC: 26 MMOL/L — SIGNIFICANT CHANGE UP (ref 17–32)
CREAT SERPL-MCNC: 0.6 MG/DL — LOW (ref 0.7–1.5)
GLUCOSE BLDC GLUCOMTR-MCNC: 107 MG/DL — HIGH (ref 70–99)
GLUCOSE BLDC GLUCOMTR-MCNC: 135 MG/DL — HIGH (ref 70–99)
GLUCOSE BLDC GLUCOMTR-MCNC: 148 MG/DL — HIGH (ref 70–99)
GLUCOSE BLDC GLUCOMTR-MCNC: 99 MG/DL — SIGNIFICANT CHANGE UP (ref 70–99)
GLUCOSE SERPL-MCNC: 87 MG/DL — SIGNIFICANT CHANGE UP (ref 70–99)
HCT VFR BLD CALC: 32.5 % — LOW (ref 37–47)
HGB BLD-MCNC: 11 G/DL — LOW (ref 12–16)
M PNEUMO IGM SER-ACNC: 149 UNITS/ML — SIGNIFICANT CHANGE UP
MCHC RBC-ENTMCNC: 29.5 PG — SIGNIFICANT CHANGE UP (ref 27–31)
MCHC RBC-ENTMCNC: 33.8 G/DL — SIGNIFICANT CHANGE UP (ref 32–37)
MCV RBC AUTO: 87.1 FL — SIGNIFICANT CHANGE UP (ref 81–99)
MYCOPLASMA AG SPEC QL: NEGATIVE — SIGNIFICANT CHANGE UP
NRBC # BLD: 0 /100 WBCS — SIGNIFICANT CHANGE UP (ref 0–0)
PLATELET # BLD AUTO: 245 K/UL — SIGNIFICANT CHANGE UP (ref 130–400)
POTASSIUM SERPL-MCNC: 4.1 MMOL/L — SIGNIFICANT CHANGE UP (ref 3.5–5)
POTASSIUM SERPL-SCNC: 4.1 MMOL/L — SIGNIFICANT CHANGE UP (ref 3.5–5)
RBC # BLD: 3.73 M/UL — LOW (ref 4.2–5.4)
RBC # FLD: 13.4 % — SIGNIFICANT CHANGE UP (ref 11.5–14.5)
SODIUM SERPL-SCNC: 142 MMOL/L — SIGNIFICANT CHANGE UP (ref 135–146)
WBC # BLD: 9.76 K/UL — SIGNIFICANT CHANGE UP (ref 4.8–10.8)
WBC # FLD AUTO: 9.76 K/UL — SIGNIFICANT CHANGE UP (ref 4.8–10.8)

## 2018-09-26 RX ORDER — IPRATROPIUM/ALBUTEROL SULFATE 18-103MCG
3 AEROSOL WITH ADAPTER (GRAM) INHALATION EVERY 6 HOURS
Qty: 0 | Refills: 0 | Status: DISCONTINUED | OUTPATIENT
Start: 2018-09-26 | End: 2018-09-27

## 2018-09-26 RX ORDER — AMLODIPINE BESYLATE 2.5 MG/1
5 TABLET ORAL DAILY
Qty: 0 | Refills: 0 | Status: DISCONTINUED | OUTPATIENT
Start: 2018-09-27 | End: 2018-09-27

## 2018-09-26 RX ORDER — AMLODIPINE BESYLATE 2.5 MG/1
5 TABLET ORAL ONCE
Qty: 0 | Refills: 0 | Status: COMPLETED | OUTPATIENT
Start: 2018-09-26 | End: 2018-09-26

## 2018-09-26 RX ADMIN — FAMOTIDINE 20 MILLIGRAM(S): 10 INJECTION INTRAVENOUS at 05:20

## 2018-09-26 RX ADMIN — SIMVASTATIN 20 MILLIGRAM(S): 20 TABLET, FILM COATED ORAL at 21:04

## 2018-09-26 RX ADMIN — HEPARIN SODIUM 5000 UNIT(S): 5000 INJECTION INTRAVENOUS; SUBCUTANEOUS at 17:02

## 2018-09-26 RX ADMIN — Medication 100 MILLIGRAM(S): at 17:02

## 2018-09-26 RX ADMIN — Medication 81 MILLIGRAM(S): at 11:54

## 2018-09-26 RX ADMIN — FAMOTIDINE 20 MILLIGRAM(S): 10 INJECTION INTRAVENOUS at 17:02

## 2018-09-26 RX ADMIN — AMLODIPINE BESYLATE 5 MILLIGRAM(S): 2.5 TABLET ORAL at 01:19

## 2018-09-26 RX ADMIN — Medication 100 MILLIGRAM(S): at 05:20

## 2018-09-26 RX ADMIN — CEFEPIME 100 MILLIGRAM(S): 1 INJECTION, POWDER, FOR SOLUTION INTRAMUSCULAR; INTRAVENOUS at 05:20

## 2018-09-26 RX ADMIN — Medication 60 MILLIGRAM(S): at 05:20

## 2018-09-26 RX ADMIN — HEPARIN SODIUM 5000 UNIT(S): 5000 INJECTION INTRAVENOUS; SUBCUTANEOUS at 05:20

## 2018-09-26 RX ADMIN — Medication 3 MILLILITER(S): at 07:55

## 2018-09-26 NOTE — PROGRESS NOTE ADULT - ASSESSMENT
79 yo female with PMH that includes HTN, DLD, who was admitted with pneumonia and hypoxemia. initally admitted to the ICU for acute hypoxic resp failure, stabilized and downgraded to the wards for ongoing management    overnight, BP was elevated but asymptomatic, treated with norvasc  today, she is walking with ambulatory pulsox of 94% on ambulation      # PNA: /p resolution of acute hypoxic resp failure. now ambulating on RA. spoke with pulm and recs incorporated  - DC cefepime today  - cont levaquin, planned for a 10 day course total antibiotics, but obtain ID input on the matter  - continue prednisone on a tapering schedule  - repeat CXR as there was a suspicion for CHF as well. is not currently on lasix. TTE with normal EF  - change nebs to prn  ---- PCP  ---- ROS      # HTN: overnight, BP was elevated but asymptomatic, treated with norvasc  ---- BP now =       # elevated D- dimer: CT chest showed no PE. per Pulm, right heart strain CT chest is likely artifact. TTE done shows (se paper chart) normal LV size and sytolic function with no segmental WMA  - dilation of 4cm of ascendiong aorta to be followed up as outpatient      #### Physical deconditioning with profound generalized body weakness  ----- Zofran PRN for nausea      #### Atypical chest pain- most likely pleuritic related to pneumonia       # coordination of care:  - VTE ppx with HSQ  - anticipate home in am pending the above 77 yo female with PMH that includes HTN, DLD, who was admitted with pneumonia and hypoxemia. initally admitted to the ICU for acute hypoxic resp failure, stabilized and downgraded to the wards for ongoing management    overnight, BP was elevated but asymptomatic, treated with norvasc  today, she is walking with ambulatory pulsox of 94% on ambulation      # PNA: /p resolution of acute hypoxic resp failure. now ambulating on RA. spoke with pulm and recs incorporated  - DC cefepime today  - cont levaquin, planned for a 10 day course total antibiotics, but to obtain ID input on the matter  - continue prednisone on a tapering schedule  - repeat CXR as there was a suspicion for CHF as well. is not currently on lasix. TTE with normal EF  - change nebs to prn      # HTN: overnight, BP was elevated but asymptomatic, treated with norvasc. BP now manual 140/78  - Rx norvasc 5mg po qday  - DASH diet      # elevated D- dimer: CT chest showed no PE. per Pulm, right heart strain CT chest is likely artifact. TTE done shows (se paper chart) normal LV size and sytolic function with no segmental WMA  - dilation of 4cm of ascendiong aorta to be followed up as outpatient. as discussed      # Physical deconditioning: now improved. ambulating. no nausea. is tolerating po  - DC zofram      # Atypical chest pain- most likely pleuritic related to pneumonia. resovled       # coordination of care:  - VTE ppx with HSQ  - can likely be discharged this evening after xrays vs tomorrow. patient and son prefer dc tomorrow. will anticipate as such  - anticipate home in am pending the above

## 2018-09-26 NOTE — PROVIDER CONTACT NOTE (MEDICATION) - SITUATION
Patient noted with elevated BP of 193/88 HR 56 @ 2200. MD Wise notified, Norvasc 2.5mg Stat x 1 time dose ordered. Patient BP reassessed, 180/79 HR 59. MD Wise notified, ordered Norvasc 5mg.

## 2018-09-26 NOTE — PROGRESS NOTE ADULT - SUBJECTIVE AND OBJECTIVE BOX
CC: f/u PNA      ROS:      RN tells me she is ambulating on RA without hypoxemia        Vital Signs Last 24 Hrs  T(C): 35.4 (25 Sep 2018 22:21), Max: 36.6 (25 Sep 2018 14:07)  T(F): 95.8 (25 Sep 2018 22:21), Max: 97.9 (25 Sep 2018 14:07)  HR: 59 (26 Sep 2018 00:38) (56 - 66)  BP: 180/79 (26 Sep 2018 00:38) (121/58 - 193/88)  BP(mean): --  RR: 18 (26 Sep 2018 00:38) (16 - 18)  SpO2: 94% (26 Sep 2018 11:53) (94% - 94%)      PHYSICAL EXAM:  GENERAL: NAD, well-groomed, well-developed  HEAD = atraumatic, normoocehalic  EYES: conjunctiva and sclera clear  NECK: Supple, No JVD  NERVOUS SYSTEM:  Alert & Oriented X3, Good concentration  CHEST/LUNG: Clear to ausculatation bilaterally; No rales, rhonchi, wheezing, or rubs  HEART: Regular rate and rhythm; No murmurs, rubs, or gallops  ABDOMEN: Soft, Nontender, Nondistended; Bowel sounds present  EXTREMITIES:  BL No clubbing, cyanosis, or edema        DATA:      Culture - Blood (collected 21 Sep 2018 21:57)  Source: .Blood None  Preliminary Report (23 Sep 2018 17:00):    No growth to date.    Culture - Blood (collected 21 Sep 2018 12:27)  Source: .Blood Blood-Peripheral  Preliminary Report (23 Sep 2018 01:01):    No growth to date.    Culture - Urine (collected 21 Sep 2018 11:07)  Source: .Urine Clean Catch (Midstream)  Final Report (22 Sep 2018 22:34):    No growth                 11.0   9.76  )-----------( 245      ( 26 Sep 2018 06:19 )             32.5       09-26    142  |  102  |  9<L>  ----------------------------<  87  4.1   |  26  |  0.6<L>    Ca    9.1      26 Sep 2018 06:19 CC: f/u PNA      ROS:  feels better  breathing more comfortable  no fevers or chills  walked without sob, though felt generally unwell when she walked, as if she needs more time to recuperate  RN tells me she is ambulating on RA without hypoxemia  son Eros is at the bedside participating per patient request      Vital Signs Last 24 Hrs  T(C): 35.4 (25 Sep 2018 22:21), Max: 36.6 (25 Sep 2018 14:07)  T(F): 95.8 (25 Sep 2018 22:21), Max: 97.9 (25 Sep 2018 14:07)  HR: 59 (26 Sep 2018 00:38) (56 - 66)  BP: 180/79 (26 Sep 2018 00:38) (121/58 - 193/88)  BP(mean): --  RR: 18 (26 Sep 2018 00:38) (16 - 18)  SpO2: 94% (26 Sep 2018 11:53) (94% - 94%)      PHYSICAL EXAM:  GENERAL: NAD, well-groomed, well-developed  NECK: Supple  NERVOUS SYSTEM:  Alert & Oriented X3, Good concentration  CHEST/LUNG: Clear to auscultation bilaterally; No rales, rhonchi, wheezing, or rubs  HEART: Regular rate and rhythm; No murmurs, rubs, or gallops  ABDOMEN: Soft, Nontender, Bowel sounds present  EXTREMITIES:  BL No clubbing, cyanosis, or edema of the BLE        DATA:      Culture - Blood (collected 21 Sep 2018 21:57)  Source: .Blood None  Preliminary Report (23 Sep 2018 17:00):    No growth to date.    Culture - Blood (collected 21 Sep 2018 12:27)  Source: .Blood Blood-Peripheral  Preliminary Report (23 Sep 2018 01:01):    No growth to date.    Culture - Urine (collected 21 Sep 2018 11:07)  Source: .Urine Clean Catch (Midstream)  Final Report (22 Sep 2018 22:34):    No growth                 11.0   9.76  )-----------( 245      ( 26 Sep 2018 06:19 )             32.5       09-26    142  |  102  |  9<L>  ----------------------------<  87  4.1   |  26  |  0.6<L>    Ca    9.1      26 Sep 2018 06:19

## 2018-09-26 NOTE — PROGRESS NOTE ADULT - ASSESSMENT
IMPRESSION:  PNA  gram negative ??   fluid over load     PLAN:  from pulmonary point can be d/c home on levaquine total 10 days from beginning need 4 more days   proair as needed   keep is = os   follow as outpatient need to repeat cxr in 3-4 weeks   dvt prophylaxis

## 2018-09-26 NOTE — PROGRESS NOTE ADULT - SUBJECTIVE AND OBJECTIVE BOX
Patient is a 78y old  Female who presents with a chief complaint of pneumonia (26 Sep 2018 12:25)      INTERVAL HISTORY/overnight events  feel good ambulating on RA no sob no cough       Vital Signs Last 24 Hrs  T(C): 35.4 (26 Sep 2018 14:36), Max: 35.4 (25 Sep 2018 22:21)  T(F): 95.7 (26 Sep 2018 14:36), Max: 95.8 (25 Sep 2018 22:21)  HR: 88 (26 Sep 2018 14:36) (56 - 88)  BP: 131/62 (26 Sep 2018 14:36) (131/62 - 193/88)  BP(mean): --  RR: 16 (26 Sep 2018 14:36) (16 - 18)  SpO2: 94% (26 Sep 2018 11:53) (94% - 94%)  Daily     Daily   I&O's Summary      Physical Examination:    General: No acute distress.  Alert, oriented, interactive, nonfocal    HEENT: Pupils equal, reactive to light.  Symmetric.    PULM: Clear to auscultation bilaterally, no significant sputum production    CVS: Regular rate and rhythm, no murmurs, rubs, or gallops    ABD: Soft, nondistended, nontender, normoactive bowel sounds, no masses    EXT: No edema, nontender    SKIN: Warm and well perfused, no rashes noted.    Neurology: no focal deficit     Musculoskeletal : Move all extremety         Lab Results:                        11.0   9.76  )-----------( 245      ( 26 Sep 2018 06:19 )             32.5     26 Sep 2018 06:19    142    |  102    |  9      ----------------------------<  87     4.1     |  26     |  0.6      Ca    9.1        26 Sep 2018 06:19                Microbiology      Medication:  MEDICATIONS  (STANDING):  aspirin enteric coated 81 milliGRAM(s) Oral daily  docusate sodium 100 milliGRAM(s) Oral two times a day  famotidine    Tablet 20 milliGRAM(s) Oral two times a day  heparin  Injectable 5000 Unit(s) SubCutaneous every 12 hours  levoFLOXacin IVPB 500 milliGRAM(s) IV Intermittent every 24 hours  predniSONE   Tablet 60 milliGRAM(s) Oral daily  simvastatin 20 milliGRAM(s) Oral at bedtime    MEDICATIONS  (PRN):  acetaminophen   Tablet .. 650 milliGRAM(s) Oral every 6 hours PRN Temp greater or equal to 38C (100.4F), Mild Pain (1 - 3)  ALBUTerol/ipratropium for Nebulization 3 milliLiter(s) Nebulizer every 6 hours PRN Bronchospasm        IMAGING STUDIES:  decrease b/l effusion and opacity

## 2018-09-27 VITALS
SYSTOLIC BLOOD PRESSURE: 154 MMHG | HEART RATE: 77 BPM | TEMPERATURE: 97 F | DIASTOLIC BLOOD PRESSURE: 71 MMHG | RESPIRATION RATE: 16 BRPM

## 2018-09-27 DIAGNOSIS — J18.1 LOBAR PNEUMONIA, UNSPECIFIED ORGANISM: ICD-10-CM

## 2018-09-27 LAB
ANION GAP SERPL CALC-SCNC: 13 MMOL/L — SIGNIFICANT CHANGE UP (ref 7–14)
BUN SERPL-MCNC: 15 MG/DL — SIGNIFICANT CHANGE UP (ref 10–20)
CALCIUM SERPL-MCNC: 9.7 MG/DL — SIGNIFICANT CHANGE UP (ref 8.5–10.1)
CHLORIDE SERPL-SCNC: 100 MMOL/L — SIGNIFICANT CHANGE UP (ref 98–110)
CO2 SERPL-SCNC: 27 MMOL/L — SIGNIFICANT CHANGE UP (ref 17–32)
CREAT SERPL-MCNC: 0.8 MG/DL — SIGNIFICANT CHANGE UP (ref 0.7–1.5)
CULTURE RESULTS: SIGNIFICANT CHANGE UP
CULTURE RESULTS: SIGNIFICANT CHANGE UP
GLUCOSE BLDC GLUCOMTR-MCNC: 94 MG/DL — SIGNIFICANT CHANGE UP (ref 70–99)
GLUCOSE SERPL-MCNC: 121 MG/DL — HIGH (ref 70–99)
HCT VFR BLD CALC: 39 % — SIGNIFICANT CHANGE UP (ref 37–47)
HGB BLD-MCNC: 13.3 G/DL — SIGNIFICANT CHANGE UP (ref 12–16)
MCHC RBC-ENTMCNC: 29.7 PG — SIGNIFICANT CHANGE UP (ref 27–31)
MCHC RBC-ENTMCNC: 34.1 G/DL — SIGNIFICANT CHANGE UP (ref 32–37)
MCV RBC AUTO: 87.1 FL — SIGNIFICANT CHANGE UP (ref 81–99)
NRBC # BLD: 0 /100 WBCS — SIGNIFICANT CHANGE UP (ref 0–0)
PLATELET # BLD AUTO: 342 K/UL — SIGNIFICANT CHANGE UP (ref 130–400)
POTASSIUM SERPL-MCNC: 4.6 MMOL/L — SIGNIFICANT CHANGE UP (ref 3.5–5)
POTASSIUM SERPL-SCNC: 4.6 MMOL/L — SIGNIFICANT CHANGE UP (ref 3.5–5)
RBC # BLD: 4.48 M/UL — SIGNIFICANT CHANGE UP (ref 4.2–5.4)
RBC # FLD: 13.4 % — SIGNIFICANT CHANGE UP (ref 11.5–14.5)
SODIUM SERPL-SCNC: 140 MMOL/L — SIGNIFICANT CHANGE UP (ref 135–146)
SPECIMEN SOURCE: SIGNIFICANT CHANGE UP
SPECIMEN SOURCE: SIGNIFICANT CHANGE UP
WBC # BLD: 12.44 K/UL — HIGH (ref 4.8–10.8)
WBC # FLD AUTO: 12.44 K/UL — HIGH (ref 4.8–10.8)

## 2018-09-27 RX ORDER — SIMVASTATIN 20 MG/1
1 TABLET, FILM COATED ORAL
Qty: 0 | Refills: 0 | COMMUNITY
Start: 2018-09-27

## 2018-09-27 RX ORDER — METOPROLOL TARTRATE 50 MG
1 TABLET ORAL
Qty: 0 | Refills: 0 | COMMUNITY

## 2018-09-27 RX ORDER — AMLODIPINE BESYLATE 2.5 MG/1
0 TABLET ORAL
Qty: 0 | Refills: 0 | COMMUNITY

## 2018-09-27 RX ORDER — ALBUTEROL 90 UG/1
2 AEROSOL, METERED ORAL
Qty: 1 | Refills: 0 | OUTPATIENT
Start: 2018-09-27

## 2018-09-27 RX ORDER — AMLODIPINE BESYLATE 2.5 MG/1
1 TABLET ORAL
Qty: 0 | Refills: 0 | COMMUNITY
Start: 2018-09-27

## 2018-09-27 RX ORDER — SIMVASTATIN 20 MG/1
1 TABLET, FILM COATED ORAL
Qty: 0 | Refills: 0 | COMMUNITY

## 2018-09-27 RX ORDER — ASPIRIN/CALCIUM CARB/MAGNESIUM 324 MG
1 TABLET ORAL
Qty: 0 | Refills: 0 | DISCHARGE
Start: 2018-09-27

## 2018-09-27 RX ORDER — AMLODIPINE BESYLATE 2.5 MG/1
1 TABLET ORAL
Qty: 30 | Refills: 0 | OUTPATIENT
Start: 2018-09-27 | End: 2018-10-26

## 2018-09-27 RX ORDER — LEVOFLOXACIN 5 MG/ML
1 INJECTION, SOLUTION INTRAVENOUS
Qty: 6 | Refills: 0
Start: 2018-09-27 | End: 2018-10-02

## 2018-09-27 RX ORDER — METOPROLOL TARTRATE 50 MG
0 TABLET ORAL
Qty: 0 | Refills: 0 | COMMUNITY

## 2018-09-27 RX ADMIN — Medication 60 MILLIGRAM(S): at 05:07

## 2018-09-27 RX ADMIN — Medication 81 MILLIGRAM(S): at 13:55

## 2018-09-27 RX ADMIN — AMLODIPINE BESYLATE 5 MILLIGRAM(S): 2.5 TABLET ORAL at 05:07

## 2018-09-27 RX ADMIN — Medication 100 MILLIGRAM(S): at 05:07

## 2018-09-27 RX ADMIN — HEPARIN SODIUM 5000 UNIT(S): 5000 INJECTION INTRAVENOUS; SUBCUTANEOUS at 05:07

## 2018-09-27 RX ADMIN — FAMOTIDINE 20 MILLIGRAM(S): 10 INJECTION INTRAVENOUS at 05:07

## 2018-09-27 NOTE — PROGRESS NOTE ADULT - ASSESSMENT
77 yo female with PMH that includes HTN, DLD, who was admitted with pneumonia and hypoxemia. initally admitted to the ICU for acute hypoxic resp failure, stabilized and downgraded to the wards for ongoing management. overall improved and no on room air      # PNA: s/p resolution of acute hypoxic resp failure. now ambulating on RA. CXR improved with no evidence for CHF. TTE with normal EF  - cont levaquin, 6 more days  - continue prednisone: 40mg po qday for 3 days, then 20mg po qday for 3 days  - Rx proair prn  - outpatient f/u PCP and pulm and repeat CXR 3-4 weeks, as advised      # HTN:   - Rx norvasc 5mg po qday      # dilation of 4cm of ascendiong aorta to be followed up as outpatient. as discussed      # stable for discharfe with PCP / pulm followups. I spent > 30 minutes on discharge planning, including collaberation with the interdiciplinary team, preparation of discharge paperwork, discharge interview / exam, and discussion with patient regarding hospital course, post discharge care, warning signs necessitating return to the hospital, and the importance of outpatient followup. Teachback method used. All questions answered.

## 2018-09-27 NOTE — DISCHARGE NOTE ADULT - PATIENT PORTAL LINK FT
You can access the [facility] Patient Portal, offered by [facility], by registering with the following website: [email or web address]

## 2018-09-27 NOTE — DISCHARGE NOTE ADULT - CARE PLAN
Principal Discharge DX:	Pneumonia of left lower lobe due to infectious organism  Goal:	sustained improvement  Assessment and plan of treatment:	please continue to take the antibiotic levofloxacin for 6 more days and the prednisone on a tapering schedule. please followup with primary care doctor next week and with pulmonary in 2 weeks  Secondary Diagnosis:	Hypoxemia  Goal:	resolved

## 2018-09-27 NOTE — DISCHARGE NOTE ADULT - MEDICATION SUMMARY - MEDICATIONS TO TAKE
I will START or STAY ON the medications listed below when I get home from the hospital:    predniSONE 20 mg oral tablet  -- 2 tab(s) by mouth once a day for 3 days then 1 tab by mouth for 3 days then stop  -- Indication: For decrease the inflamation    aspirin 81 mg oral delayed release tablet  -- 1 tab(s) by mouth once a day  -- Indication: For Per home medsications    simvastatin 20 mg oral tablet  -- 1 tab(s) by mouth once a day (at bedtime)  -- Indication: For cholesterol    ProAir HFA 90 mcg/inh inhalation aerosol  -- 2 puff(s) inhaled every 8 hours, As Needed for bronchospasm  -- For inhalation only.  It is very important that you take or use this exactly as directed.  Do not skip doses or discontinue unless directed by your doctor.  Obtain medical advice before taking any non-prescription drugs as some may affect the action of this medication.  Shake well before use.    -- Indication: For Pneumonia    amLODIPine 5 mg oral tablet  -- 1 tab(s) by mouth once a day  -- Indication: For High blood pressure    hydroCHLOROthiazide 12.5 mg oral capsule  -- 1 cap(s) by mouth once a day  -- Indication: For High blood pressure    levoFLOXacin 750 mg oral tablet  -- 1 tab(s) by mouth every 24 hours  -- Indication: For Antibiotic for pneumonia

## 2018-09-27 NOTE — DISCHARGE NOTE ADULT - CARE PROVIDER_API CALL
Dr Lorenzo Xiao at [facility],   Phone: (   )    -  Fax: (   )    -    Kwesi Laboy), Internal Medicine; Pulmonary Disease  [address] [place] [address]  Phone: [phone]  Fax: [phone]

## 2018-09-27 NOTE — CONSULT NOTE ADULT - SUBJECTIVE AND OBJECTIVE BOX
MAGDALENA MICHAUD 78yFemalePatient is a 78y old  Female who presents with a chief complaint of pneumonia (26 Sep 2018 18:30)      Patient has history of:  No Known Allergies    PMH - not relevant    FSH - not relevant    ROS - not contributory     Patient treated with:  levoFLOXacin IVPB 500 milliGRAM(s) IV Intermittent every 24 hours    PHYSICAL EXAM  T(F): 96.5 (09-27-18 @ 06:00), Max: 96.7 (09-26-18 @ 22:22)  HR: 63 (09-27-18 @ 06:00) (62 - 88)  BP: 169/77 (09-27-18 @ 06:00) (131/62 - 174/87)  RR: 16 (09-27-18 @ 06:00) (16 - 16)  SpO2: 94% (09-26-18 @ 11:53) (94% - 94%)  Daily     Daily     HEENT: normal, no nuchal rigidity  Cor: RSR Nl S1 S2  Lungs: few rales left base  Decreased breath sounds at bases  Abdomen: Nontender, Nl BS,   Ext: No phlebitis     LAB & RADIOLOGIC RESULTS:                        11.0   9.76  )-----------( 245      ( 26 Sep 2018 06:19 )             32.5         09-26    142  |  102  |  9<L>  ----------------------------<  87  4.1   |  26  |  0.6<L>    Ca    9.1      26 Sep 2018 06:19      Progress Note Adult-Pulmonology Attending [GEENA Laboy] (09-26-18 @ 18:30)  Progress Note Adult-Hospitalist Attending [SUZANNE Valladares] (09-26-18 @ 12:25)  Provider Contact Note (Medication) [KAM Bedoya] (09-26-18 @ 01:38)  Care Coordination Assessment [CDarryl Meyer] (09-25-18 @ 14:17)  Progress Note Adult-Internal Medicine Attending [HERSON Manzano] (09-25-18 @ 11:40)  Pharmacy Intervention Note [HARRISON Mendoza] (09-24-18 @ 11:24)  Progress Note Adult-Pulmonology Attending [GEENA Laboy] (09-24-18 @ 08:17)  Progress Note Adult-Internal Medicine Resident/Attending [HERSON Hamilton] (09-24-18 @ 07:11)  Progress Note Adult-Critical Care Attending [MORRIS Feng] (09-23-18 @ 23:26)  Progress Note Adult-Internal Medicine Attending [HERSON Manzano] (09-23-18 @ 15:16)  Progress Note Adult-Internal Medicine Resident/Attending [HERSON Pete] (09-22-18 @ 10:05)  Consult Note Adult-Critical Care Attending [JENIFER Weinstein] (09-21-18 @ 18:32)  Change in Status Notification [JENIFER Peng] (09-21-18 @ 18:32)  Chart Note-Event Note Attending [HERSON Manzano] (09-21-18 @ 18:32)  Provider Contact Note (Other) [JENIFER Peng] (09-21-18 @ 15:24)  Provider Contact Note (Other) [JENIFER Peng] (09-21-18 @ 15:07)  Consult Note Adult-Pulmonology Attending [DIANA Bello] (09-21-18 @ 12:18)  Progress Note Adult-Internal Medicine Attending [HERSON Manzano] (09-21-18 @ 10:25)  Chart Note-Event Note Attending [HERSON Manzano] (09-20-18 @ 14:09)  [facility] Release of Information [KEYUR England] (09-20-18 @ 11:03)  Patient Profile Adult [HARRISON Daugherty] (09-20-18 @ 07:19)  ED ADULT Nurse Note [AILEEN Babcock] (09-20-18 @ 05:32)  ED ADULT Nurse Reassessment Note [AILEEN Hardy] (09-20-18 @ 04:36)  ED Provider Note [MORRIS Mckeon] (09-20-18 @ 03:51)  ED ADULT Triage Note [AILEEN Hardy] (09-19-18 @ 22:29)      Culture - Blood (collected 09-21-18 @ 21:57)  Source: .Blood None  Preliminary Report (09-23-18 @ 17:00):    No growth to date.    Culture - Blood (collected 09-21-18 @ 12:27)  Source: .Blood Blood-Peripheral  Final Report (09-27-18 @ 01:00):    No growth at 5 days.

## 2018-09-27 NOTE — DISCHARGE NOTE ADULT - OTHER SIGNIFICANT FINDINGS
note for primary care physician:    77 yo female with PMH that includes HTN, DLD, who was admitted with pneumonia and hypoxemia. initally admitted to the ICU for acute hypoxic resp failure, stabilized and downgraded to the wards for ongoing management. overall improved and now on room air    # PNA: s/p resolution of acute hypoxic respiratory failure. now ambulating on RA. CXR improved with no evidence for CHF. TTE with normal EF  - cont levaquin, 6 more days  - continue prednisone: 40mg po qday for 3 days, then 20mg po qday for 3 days  - Rx proair prn  - outpatient f/u PCP and pulm and repeat CXR 3-4 weeks, as advised      # HTN:   - advised to resume home norvasc and HCTZ. keep Beta blocker off due to heart rate  - advised to followup with primary care doctor      # dilation of 4cm of ascendiong aorta to be followed up as outpatient. as discussed      Recent labs include:      Culture - Blood (collected 21 Sep 2018 21:57)  Source: .Blood None  Preliminary Report (23 Sep 2018 17:00):    No growth to date.    Culture - Blood (collected 21 Sep 2018 12:27)  Source: .Blood Blood-Peripheral  Final Report (27 Sep 2018 01:00):    No growth at 5 days.    Culture - Urine (collected 21 Sep 2018 11:07)  Source: .Urine Clean Catch (Midstream)                        13.3   12.44 )-----------( 342      ( 27 Sep 2018 09:55 )             39.0     09-27    140  |  100  |  15  ----------------------------<  121<H>  4.6   |  27  |  0.8    Ca    9.7      27 Sep 2018 09:55      Final Report (22 Sep 2018 22:34):    No growth

## 2018-09-27 NOTE — PHYSICAL THERAPY INITIAL EVALUATION ADULT - GENERAL OBSERVATIONS, REHAB EVAL
08:17-08:37 Chart reviewed. Pt encountered standing in room,  may be seen by Physical Therapist as confirmed with Nurse. Patient denied pain at rest and expresses no concerns with walking, but "gets tired more easily"

## 2018-09-27 NOTE — PROGRESS NOTE ADULT - REASON FOR ADMISSION
pneumonia

## 2018-09-27 NOTE — DISCHARGE NOTE ADULT - PLAN OF CARE
sustained improvement please continue to take the antibiotic levofloxacin for 6 more days and the prednisone on a tapering schedule. please followup with primary care doctor next week and with pulmonary in 2 weeks resolved

## 2018-09-27 NOTE — PHYSICAL THERAPY INITIAL EVALUATION ADULT - DISCHARGE DISPOSITION, PT EVAL
As discussed with RN and agreed with patient, no further skilled PT indicated at this time in this setting as patient independent and safe with all functional mobility inc ambulation on level and stairs with no loss of balance.  Pateint to continue with activity as indicated. Please reconsult as needed.

## 2018-09-27 NOTE — DISCHARGE NOTE ADULT - PROVIDER TOKENS
FREE:[LAST:[Dr Lorenzo Xiao at [facility]],PHONE:[(   )    -],FAX:[(   )    -]],TOKEN:'[id number]'

## 2018-09-27 NOTE — PROGRESS NOTE ADULT - SUBJECTIVE AND OBJECTIVE BOX
CC: f/u PNA      INTERVAL EVENTS INCLUDE:  CXR showed improvement  seen by ID who agreed with levaquin, but adjusted dose / duration  seen by PT, no needs identified      ROS:        Vital Signs Last 24 Hrs  T(C): 35.8 (27 Sep 2018 06:00), Max: 35.9 (26 Sep 2018 22:22)  T(F): 96.5 (27 Sep 2018 06:00), Max: 96.7 (26 Sep 2018 22:22)  HR: 63 (27 Sep 2018 06:00) (62 - 88)  BP: 169/77 (27 Sep 2018 06:00) (131/62 - 174/87)  BP(mean): --  RR: 16 (27 Sep 2018 06:00) (16 - 16)  SpO2: --      PHYSICAL EXAM:  GENERAL: NAD, well-groomed, well-developed  NECK: Supple  NERVOUS SYSTEM:  Alert & Oriented X3, Good concentration  CHEST/LUNG: Clear to auscultation bilaterally; No rales, rhonchi, wheezing, or rubs  HEART: Regular rate and rhythm; No murmurs, rubs, or gallops  ABDOMEN: Soft, Nontender, Bowel sounds present  EXTREMITIES:  BL No clubbing, cyanosis, or edema of the BLE        DATA:      Culture - Blood (collected 21 Sep 2018 21:57)  Source: .Blood None  Preliminary Report (23 Sep 2018 17:00):    No growth to date.    Culture - Blood (collected 21 Sep 2018 12:27)  Source: .Blood Blood-Peripheral  Final Report (27 Sep 2018 01:00):    No growth at 5 days.    Culture - Urine (collected 21 Sep 2018 11:07)  Source: .Urine Clean Catch (Midstream)  Final Report (22 Sep 2018 22:34):    No growth                            13.3   12.44 )-----------( 342      ( 27 Sep 2018 09:55 )             39.0       09-27    140  |  100  |  15  ----------------------------<  121<H>  4.6   |  27  |  0.8    Ca    9.7      27 Sep 2018 09:55 CC: f/u PNA      INTERVAL EVENTS INCLUDE:  CXR showed improvement  seen by ID who agreed with levaquin, but adjusted dose / duration  seen by PT, no needs identified      ROS:  no cough  no fevers or chills  walking is better  not on o2 > 48 hrs        Vital Signs Last 24 Hrs  T(C): 35.8 (27 Sep 2018 06:00), Max: 35.9 (26 Sep 2018 22:22)  T(F): 96.5 (27 Sep 2018 06:00), Max: 96.7 (26 Sep 2018 22:22)  HR: 63 (27 Sep 2018 06:00) (62 - 88)  BP: 169/77 (27 Sep 2018 06:00) (131/62 - 174/87)  BP(mean): --  RR: 16 (27 Sep 2018 06:00) (16 - 16)  SpO2: --      PHYSICAL EXAM:  GENERAL: NAD, well-groomed, well-developed  NECK: Supple  NERVOUS SYSTEM:  Alert & Oriented X3, Good concentration  CHEST/LUNG: Clear to auscultation bilaterally; No rales, rhonchi, wheezing, or rubs. walked with me in the hallway 96% RA  HEART: Regular rate and rhythm; No murmurs, rubs, or gallops  ABDOMEN: Soft, Nontender, Bowel sounds present  EXTREMITIES:  BL No clubbing, cyanosis, or edema of the BLE        DATA:      Culture - Blood (collected 21 Sep 2018 21:57)  Source: .Blood None  Preliminary Report (23 Sep 2018 17:00):    No growth to date.    Culture - Blood (collected 21 Sep 2018 12:27)  Source: .Blood Blood-Peripheral  Final Report (27 Sep 2018 01:00):    No growth at 5 days.    Culture - Urine (collected 21 Sep 2018 11:07)  Source: .Urine Clean Catch (Midstream)  Final Report (22 Sep 2018 22:34):    No growth                            13.3   12.44 )-----------( 342      ( 27 Sep 2018 09:55 )             39.0       09-27    140  |  100  |  15  ----------------------------<  121<H>  4.6   |  27  |  0.8    Ca    9.7      27 Sep 2018 09:55

## 2018-10-05 DIAGNOSIS — R63.8 OTHER SYMPTOMS AND SIGNS CONCERNING FOOD AND FLUID INTAKE: ICD-10-CM

## 2018-10-05 DIAGNOSIS — E78.5 HYPERLIPIDEMIA, UNSPECIFIED: ICD-10-CM

## 2018-10-05 DIAGNOSIS — R07.89 OTHER CHEST PAIN: ICD-10-CM

## 2018-10-05 DIAGNOSIS — J96.01 ACUTE RESPIRATORY FAILURE WITH HYPOXIA: ICD-10-CM

## 2018-10-05 DIAGNOSIS — D72.819 DECREASED WHITE BLOOD CELL COUNT, UNSPECIFIED: ICD-10-CM

## 2018-10-05 DIAGNOSIS — R53.1 WEAKNESS: ICD-10-CM

## 2018-10-05 DIAGNOSIS — R10.30 LOWER ABDOMINAL PAIN, UNSPECIFIED: ICD-10-CM

## 2018-10-05 DIAGNOSIS — R79.1 ABNORMAL COAGULATION PROFILE: ICD-10-CM

## 2018-10-05 DIAGNOSIS — J18.9 PNEUMONIA, UNSPECIFIED ORGANISM: ICD-10-CM

## 2018-10-05 DIAGNOSIS — E87.2 ACIDOSIS: ICD-10-CM

## 2018-10-05 DIAGNOSIS — I77.819 AORTIC ECTASIA, UNSPECIFIED SITE: ICD-10-CM

## 2018-10-05 DIAGNOSIS — I10 ESSENTIAL (PRIMARY) HYPERTENSION: ICD-10-CM

## 2018-10-05 DIAGNOSIS — E87.1 HYPO-OSMOLALITY AND HYPONATREMIA: ICD-10-CM

## 2018-10-24 ENCOUNTER — EMERGENCY (EMERGENCY)
Facility: HOSPITAL | Age: 78
LOS: 0 days | Discharge: HOME | End: 2018-10-24
Attending: EMERGENCY MEDICINE | Admitting: EMERGENCY MEDICINE

## 2018-10-24 VITALS
DIASTOLIC BLOOD PRESSURE: 86 MMHG | HEIGHT: 64 IN | TEMPERATURE: 98 F | RESPIRATION RATE: 19 BRPM | HEART RATE: 90 BPM | WEIGHT: 149.91 LBS | OXYGEN SATURATION: 99 % | SYSTOLIC BLOOD PRESSURE: 154 MMHG

## 2018-10-24 DIAGNOSIS — K59.00 CONSTIPATION, UNSPECIFIED: ICD-10-CM

## 2018-10-24 DIAGNOSIS — R10.30 LOWER ABDOMINAL PAIN, UNSPECIFIED: ICD-10-CM

## 2018-10-24 DIAGNOSIS — R14.1 GAS PAIN: ICD-10-CM

## 2018-10-24 DIAGNOSIS — Z79.82 LONG TERM (CURRENT) USE OF ASPIRIN: ICD-10-CM

## 2018-10-24 DIAGNOSIS — I10 ESSENTIAL (PRIMARY) HYPERTENSION: ICD-10-CM

## 2018-10-24 DIAGNOSIS — R10.9 UNSPECIFIED ABDOMINAL PAIN: ICD-10-CM

## 2018-10-24 DIAGNOSIS — Z79.899 OTHER LONG TERM (CURRENT) DRUG THERAPY: ICD-10-CM

## 2018-10-24 DIAGNOSIS — E78.5 HYPERLIPIDEMIA, UNSPECIFIED: ICD-10-CM

## 2018-10-24 NOTE — ED PROVIDER NOTE - NS ED ROS FT
Constitutional: no fever, chills  Cardiovascular: no chest pain, no sob  Respiratory: no cough, no shortness of breath,  Gastrointestinal: + constipation, + abdominal discomfort. no nausea, vomiting or diarrhea.  no melena or BRBPR. no prior abdominal surgeries.   : no urinary sxs, no flank pain.   Integumentary: no rash or skin changes. no edema  Neurological: no headache, no dizziness, no visual changes, no UE/LE weakness or paresthesias.

## 2018-10-24 NOTE — ED PROVIDER NOTE - PROGRESS NOTE DETAILS
Pt feeling much better after having Large BM and disimpaction. d/w patient stool softener and GI f/u. ABD + Bs soft nt/nd

## 2018-10-24 NOTE — ED ADULT NURSE NOTE - NS ED NURSE RECORD ANOTHER HT AND WT
"SUBJECTIVE:  Chief Complaint   Patient presents with     Foot Pain     left foot pain since yesterday.      Maico Shen is a 38 year old male presents with a chief complaint of left foot pain, onset yesterday.  No specific injury.   He has been training on a \"[employer] board\" recently.   He is a runner, but has not been running much recently, however, he is doing a lot of walking.   He denies any numbness or tingling.      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  His wife is a physician.      Patient would like to have a prescription for the Voltaren Gel.  He never filled it when it was prescribed in December 2017.       No past medical history on file.     Previous foot pain, see Sports Ortho visit from December 2017.    There is no problem list on file for this patient.    Social History   Substance Use Topics     Smoking status: Never Smoker     Smokeless tobacco: Never Used     Alcohol use Not on file       ROS:  CONSTITUTIONAL:NEGATIVE for fever, chills, change in weight  INTEGUMENTARY/SKIN: NEGATIVE for worrisome rashes, moles or lesions  MUSCULOSKELETAL: as per HPI  NEURO: NEGATIVE for weakness, dizziness or paresthesias  Review of systems negative except as stated above.    EXAM:   /79  Pulse 71  Temp 98.2  F (36.8  C) (Oral)  Wt 231 lb 5 oz (104.9 kg)  BMI 28.16 kg/m2  Gen: healthy,alert,no distress  Extremity: left foot: tenderness over base of 5th metatarsal.  No swelling.    There is not compromise to the distal circulation.  Pulses are +2 and CRT is briskSKIN: no suspicious lesions or rashes  NEURO: Normal strength and tone, sensory exam grossly normal, mentation intact and speech normal    (S99.922A) Foot injury, left, initial encounter  (primary encounter diagnosis)  Comment: possible stress fracture versus tendon injury  Plan: order for DME, diclofenac (VOLTAREN) 1 % GEL         topical gel          Ice to area as needed over thin cloth    REST.  No running or weight bearing training until follow up " with sports med clinic.      Patient expresses understanding and agreement with the assessment and plan as above.         Yes

## 2018-10-24 NOTE — ED ADULT NURSE NOTE - NSIMPLEMENTINTERV_GEN_ALL_ED
Implemented All Universal Safety Interventions:  [place] to call system. Call bell, personal items and telephone within reach. Instruct patient to call for assistance. Room bathroom lighting operational. Non-slip footwear when patient is off stretcher. Physically safe environment: no spills, clutter or unnecessary equipment. Stretcher in lowest position, wheels locked, appropriate side rails in place.

## 2018-10-24 NOTE — ED PROVIDER NOTE - OBJECTIVE STATEMENT
77 yo female with h/o HTN, HLD, recent admission for pneumonia presents to the ED c/o constipation x 1 week. Associated with lower abdominal pain. Patient tolerating PO, + passing gas. No h/o abdominal surgery. Denies fever, chills, chest pain, sob, N/V, urinary sxs, flank pain, rectal bleeding.

## 2018-10-24 NOTE — ED PROVIDER NOTE - MEDICAL DECISION MAKING DETAILS
78yF recent admission for  pna  - home 3 weeks p/w  constipation no BM x 1 week   = passing flatus,  no vomiting  tolerating PO normal appetite -  discomfort to abdomen - no abdominal surgeries -  feels   stool impacted at rectum. PE: alert nontoxic cvs rrr reps cta abd soft nontender, nondistended   rectal:  stool impaction -  disimpacted -

## 2018-10-24 NOTE — ED PROVIDER NOTE - PHYSICAL EXAMINATION
GENERAL:  well appearing, non-toxic female in no acute distress  SKIN: skin warm, pink and dry. MMM.   PULM: CTAB. Normal respiratory effort. No respiratory distress. No wheezes, stridor, rales or rhonchi. No retractions  CV: RRR, no M/R/G.   ABD: Soft, non-tender, non-distended. No rebound or guarding. No CVA tenderness.  rectal: + hard stool in rectal vault.   MSK: FROM of all extremities.  No MSK tenderness. No edema, erythema, cyanosis. Distal pulses intact.  NEURO: A+Ox3, no sensory/motor deficits

## 2018-10-25 PROBLEM — I10 ESSENTIAL (PRIMARY) HYPERTENSION: Chronic | Status: ACTIVE | Noted: 2018-09-20

## 2018-10-25 PROBLEM — E78.5 HYPERLIPIDEMIA, UNSPECIFIED: Chronic | Status: ACTIVE | Noted: 2018-09-20

## 2019-11-02 ENCOUNTER — EMERGENCY (EMERGENCY)
Facility: HOSPITAL | Age: 79
LOS: 0 days | Discharge: HOME | End: 2019-11-03
Attending: EMERGENCY MEDICINE | Admitting: EMERGENCY MEDICINE
Payer: MEDICARE

## 2019-11-02 VITALS
HEIGHT: 65 IN | SYSTOLIC BLOOD PRESSURE: 150 MMHG | TEMPERATURE: 97 F | HEART RATE: 69 BPM | RESPIRATION RATE: 16 BRPM | DIASTOLIC BLOOD PRESSURE: 68 MMHG | OXYGEN SATURATION: 99 % | WEIGHT: 154.98 LBS

## 2019-11-02 DIAGNOSIS — Z79.82 LONG TERM (CURRENT) USE OF ASPIRIN: ICD-10-CM

## 2019-11-02 DIAGNOSIS — E78.5 HYPERLIPIDEMIA, UNSPECIFIED: ICD-10-CM

## 2019-11-02 DIAGNOSIS — R10.9 UNSPECIFIED ABDOMINAL PAIN: ICD-10-CM

## 2019-11-02 DIAGNOSIS — I10 ESSENTIAL (PRIMARY) HYPERTENSION: ICD-10-CM

## 2019-11-02 DIAGNOSIS — R10.32 LEFT LOWER QUADRANT PAIN: ICD-10-CM

## 2019-11-02 LAB
ALBUMIN SERPL ELPH-MCNC: 4.6 G/DL — SIGNIFICANT CHANGE UP (ref 3.5–5.2)
ALP SERPL-CCNC: 58 U/L — SIGNIFICANT CHANGE UP (ref 30–115)
ALT FLD-CCNC: 9 U/L — SIGNIFICANT CHANGE UP (ref 0–41)
ANION GAP SERPL CALC-SCNC: 11 MMOL/L — SIGNIFICANT CHANGE UP (ref 7–14)
APPEARANCE UR: CLEAR — SIGNIFICANT CHANGE UP
AST SERPL-CCNC: 13 U/L — SIGNIFICANT CHANGE UP (ref 0–41)
BACTERIA # UR AUTO: SIGNIFICANT CHANGE UP /HPF
BASOPHILS # BLD AUTO: 0.03 K/UL — SIGNIFICANT CHANGE UP (ref 0–0.2)
BASOPHILS NFR BLD AUTO: 0.5 % — SIGNIFICANT CHANGE UP (ref 0–1)
BILIRUB SERPL-MCNC: 0.3 MG/DL — SIGNIFICANT CHANGE UP (ref 0.2–1.2)
BILIRUB UR-MCNC: NEGATIVE — SIGNIFICANT CHANGE UP
BUN SERPL-MCNC: 14 MG/DL — SIGNIFICANT CHANGE UP (ref 10–20)
CALCIUM SERPL-MCNC: 10.5 MG/DL — HIGH (ref 8.5–10.1)
CHLORIDE SERPL-SCNC: 100 MMOL/L — SIGNIFICANT CHANGE UP (ref 98–110)
CO2 SERPL-SCNC: 29 MMOL/L — SIGNIFICANT CHANGE UP (ref 17–32)
COLOR SPEC: YELLOW — SIGNIFICANT CHANGE UP
CREAT SERPL-MCNC: 0.7 MG/DL — SIGNIFICANT CHANGE UP (ref 0.7–1.5)
DIFF PNL FLD: NEGATIVE — SIGNIFICANT CHANGE UP
EOSINOPHIL # BLD AUTO: 0.11 K/UL — SIGNIFICANT CHANGE UP (ref 0–0.7)
EOSINOPHIL NFR BLD AUTO: 1.7 % — SIGNIFICANT CHANGE UP (ref 0–8)
EPI CELLS # UR: ABNORMAL /HPF
GLUCOSE SERPL-MCNC: 93 MG/DL — SIGNIFICANT CHANGE UP (ref 70–99)
GLUCOSE UR QL: NEGATIVE MG/DL — SIGNIFICANT CHANGE UP
HCT VFR BLD CALC: 42.2 % — SIGNIFICANT CHANGE UP (ref 37–47)
HGB BLD-MCNC: 14 G/DL — SIGNIFICANT CHANGE UP (ref 12–16)
IMM GRANULOCYTES NFR BLD AUTO: 0.3 % — SIGNIFICANT CHANGE UP (ref 0.1–0.3)
KETONES UR-MCNC: NEGATIVE — SIGNIFICANT CHANGE UP
LACTATE SERPL-SCNC: 1.4 MMOL/L — SIGNIFICANT CHANGE UP (ref 0.5–2.2)
LEUKOCYTE ESTERASE UR-ACNC: ABNORMAL
LIDOCAIN IGE QN: 23 U/L — SIGNIFICANT CHANGE UP (ref 7–60)
LYMPHOCYTES # BLD AUTO: 2.1 K/UL — SIGNIFICANT CHANGE UP (ref 1.2–3.4)
LYMPHOCYTES # BLD AUTO: 31.6 % — SIGNIFICANT CHANGE UP (ref 20.5–51.1)
MCHC RBC-ENTMCNC: 29.4 PG — SIGNIFICANT CHANGE UP (ref 27–31)
MCHC RBC-ENTMCNC: 33.2 G/DL — SIGNIFICANT CHANGE UP (ref 32–37)
MCV RBC AUTO: 88.5 FL — SIGNIFICANT CHANGE UP (ref 81–99)
MONOCYTES # BLD AUTO: 0.65 K/UL — HIGH (ref 0.1–0.6)
MONOCYTES NFR BLD AUTO: 9.8 % — HIGH (ref 1.7–9.3)
NEUTROPHILS # BLD AUTO: 3.73 K/UL — SIGNIFICANT CHANGE UP (ref 1.4–6.5)
NEUTROPHILS NFR BLD AUTO: 56.1 % — SIGNIFICANT CHANGE UP (ref 42.2–75.2)
NITRITE UR-MCNC: NEGATIVE — SIGNIFICANT CHANGE UP
NRBC # BLD: 0 /100 WBCS — SIGNIFICANT CHANGE UP (ref 0–0)
PH UR: 7.5 — SIGNIFICANT CHANGE UP (ref 5–8)
PLATELET # BLD AUTO: 300 K/UL — SIGNIFICANT CHANGE UP (ref 130–400)
POTASSIUM SERPL-MCNC: 4.8 MMOL/L — SIGNIFICANT CHANGE UP (ref 3.5–5)
POTASSIUM SERPL-SCNC: 4.8 MMOL/L — SIGNIFICANT CHANGE UP (ref 3.5–5)
PROT SERPL-MCNC: 7.2 G/DL — SIGNIFICANT CHANGE UP (ref 6–8)
PROT UR-MCNC: NEGATIVE MG/DL — SIGNIFICANT CHANGE UP
RBC # BLD: 4.77 M/UL — SIGNIFICANT CHANGE UP (ref 4.2–5.4)
RBC # FLD: 13.3 % — SIGNIFICANT CHANGE UP (ref 11.5–14.5)
RBC CASTS # UR COMP ASSIST: SIGNIFICANT CHANGE UP /HPF
SODIUM SERPL-SCNC: 140 MMOL/L — SIGNIFICANT CHANGE UP (ref 135–146)
SP GR SPEC: 1.01 — SIGNIFICANT CHANGE UP (ref 1.01–1.03)
UROBILINOGEN FLD QL: 0.2 MG/DL — SIGNIFICANT CHANGE UP (ref 0.2–0.2)
WBC # BLD: 6.64 K/UL — SIGNIFICANT CHANGE UP (ref 4.8–10.8)
WBC # FLD AUTO: 6.64 K/UL — SIGNIFICANT CHANGE UP (ref 4.8–10.8)
WBC UR QL: SIGNIFICANT CHANGE UP /HPF

## 2019-11-02 PROCEDURE — 99284 EMERGENCY DEPT VISIT MOD MDM: CPT

## 2019-11-02 PROCEDURE — 74177 CT ABD & PELVIS W/CONTRAST: CPT | Mod: 26

## 2019-11-02 RX ORDER — FAMOTIDINE 10 MG/ML
20 INJECTION INTRAVENOUS ONCE
Refills: 0 | Status: COMPLETED | OUTPATIENT
Start: 2019-11-02 | End: 2019-11-02

## 2019-11-02 RX ORDER — SODIUM CHLORIDE 9 MG/ML
1000 INJECTION INTRAMUSCULAR; INTRAVENOUS; SUBCUTANEOUS ONCE
Refills: 0 | Status: COMPLETED | OUTPATIENT
Start: 2019-11-02 | End: 2019-11-02

## 2019-11-02 RX ADMIN — Medication 20 MILLIGRAM(S): at 23:14

## 2019-11-02 RX ADMIN — SODIUM CHLORIDE 1000 MILLILITER(S): 9 INJECTION INTRAMUSCULAR; INTRAVENOUS; SUBCUTANEOUS at 21:49

## 2019-11-02 RX ADMIN — FAMOTIDINE 100 MILLIGRAM(S): 10 INJECTION INTRAVENOUS at 21:49

## 2019-11-02 NOTE — ED PROVIDER NOTE - OBJECTIVE STATEMENT
78 yo female hx of HTN/HLD present c/o left sided abdominal pain radiating to left flank x 1 day. reported similar pain in the past (LLQ region) but never like this bad. reported off/on constipation for years and last BM was yesterday. reported last colonoscopy 8 years ago and she was told it was normal. pain is mild to moderate now. Denies fever/chill/nausea/vomiting/diarrhea/change of appetite and urinary sxs. Denies HA/dizziness/chest pain/sob/palpitations and pain to extremities and ambulatory difficulty.

## 2019-11-02 NOTE — ED PROVIDER NOTE - PROGRESS NOTE DETAILS
pain similar but continue to decline pain medication. reviewed CT images and noted gas and fecal load. encourage to use laxative at home. encourage return for worsening sxs.

## 2019-11-02 NOTE — ED PROVIDER NOTE - CARE PROVIDERS DIRECT ADDRESSES
,[email or web address]

## 2019-11-02 NOTE — ED PROVIDER NOTE - PATIENT PORTAL LINK FT
You can access the FollowMyHealth Patient Portal offered by [facility] by registering at the following website: [email or web address]. By joining [employer]’s FollowMyHealth portal, you will also be able to view your health information using other applications (apps) compatible with our system.

## 2019-11-02 NOTE — ED ADULT NURSE NOTE - NSIMPLEMENTINTERV_GEN_ALL_ED
Implemented All Fall Risk Interventions:  [place] to call system. Call bell, personal items and telephone within reach. Instruct patient to call for assistance. Room bathroom lighting operational. Non-slip footwear when patient is off stretcher. Physically safe environment: no spills, clutter or unnecessary equipment. Stretcher in lowest position, wheels locked, appropriate side rails in place. Provide visual cue, wrist band, yellow gown, etc. Monitor gait and stability. Monitor for mental status changes and reorient to person, place, and time. Review medications for side effects contributing to fall risk. Reinforce activity limits and safety measures with patient and family.

## 2019-11-02 NOTE — ED PROVIDER NOTE - PROVIDER TOKENS
PROVIDER:[TOKEN:[[id number]]],FREE:[LAST:[Your Primary Care Provider and Your GI],PHONE:[(   )    -],FAX:[(   )    -]]

## 2019-11-02 NOTE — ED PROVIDER NOTE - PHYSICAL EXAMINATION
CONSTITUTIONAL: Well-appearing; well-nourished; in no apparent distress.   EYES: PERRL; EOM intact.   CARDIOVASCULAR: Normal S1, S2; no murmurs, rubs, or gallops.   RESPIRATORY: Normal chest excursion with respiration; breath sounds clear and equal bilaterally; no wheezes, rhonchi, or rales.  GI/: + LLQ tenderness. Normal bowel sounds; non-distended; no palpable organomegaly. no CVAT.   MS: No midline tenderness to neck/back  SKIN: Normal for age and race; warm; dry; good turgor; no apparent lesions or exudate.   NEURO/PSYCH: A & O x 4; grossly unremarkable.

## 2019-11-02 NOTE — ED PROVIDER NOTE - NS ED ROS FT
Constitutional: no fever, chills, no recent weight loss, change in appetite or malaise  Eyes: no redness/discharge/pain/vision changes  ENT: no rhinorrhea/ear pain/sore throat  Cardiac: No chest pain, SOB or edema.  Respiratory: No cough or respiratory distress  GI: see HPI  : No dysuria, frequency, urgency or hematuria  MS: no pain to back or extremities, no loss of ROM, no weakness  Neuro: No headache or weakness. No LOC.  Skin: No skin rash.  Endocrine: No history of thyroid disease or diabetes.

## 2019-11-02 NOTE — ED PROVIDER NOTE - CARE PROVIDER_API CALL
Terence Stephenson)  Gastroenterology  [address] [place] [address]  Phone: [phone]  Fax: [phone]  Follow Up Time:     Your Primary Care Provider and Your GI,   Phone: (   )    -  Fax: (   )    -  Follow Up Time:

## 2019-11-02 NOTE — ED PROVIDER NOTE - ATTENDING CONTRIBUTION TO CARE
78yo F with PMHx HTN, HLD, ?GERD, p/w left flank pain radiating to left mid-abdomen, mild for 2 days and more severe a few hours ago, dull, no alleviating factors, not related to PO intake. Feels "bloated" and has more frequent belching. Last BM this morning, normal in appearance, no blood, no melena. Denies fever, headache, dizziness, lightheadedness, CP, SOB, cough, nausea, vomiting, diarrhea, dysuria, hematuria, rash. No h/o abdominal surgeries.     Vital signs reviewed  GENERAL: Patient nontoxic appearing, NAD  HEAD: NCAT  EYES: Anicteric  ENT: MMM  RESPIRATORY: Normal respiratory effort. CTA B/L. No wheezing, rales, rhonchi  CARDIOVASCULAR: Regular rate and rhythm  ABDOMEN: Soft. Nondistended. Mild left-mid TTP. No guarding or rebound. No CVA tenderness.  MUSCULOSKELETAL/EXTREMITIES: Brisk cap refill. Equal radial pulses. No leg edema.  SKIN:  Warm and dry  NEURO: AAOx3. No gross FND.

## 2019-11-02 NOTE — ED PROVIDER NOTE - CLINICAL SUMMARY MEDICAL DECISION MAKING FREE TEXT BOX
78yo F p/w flank and abd pain. Vitals stable, well appearing. Labs unremarkable. CT A/P negative. Pain controlled. Will f/u with PMD/GI.   Patient to be discharged from ED. Any available test results were discussed with patient and/or family. Verbal instructions given, including instructions to return to ED immediately for any new, worsening, or concerning symptoms. Patient endorsed understanding. Written discharge instructions additionally given, including follow-up plan.

## 2019-11-03 VITALS
SYSTOLIC BLOOD PRESSURE: 170 MMHG | RESPIRATION RATE: 17 BRPM | OXYGEN SATURATION: 95 % | DIASTOLIC BLOOD PRESSURE: 77 MMHG | HEART RATE: 55 BPM

## 2019-11-03 PROCEDURE — 71046 X-RAY EXAM CHEST 2 VIEWS: CPT | Mod: 26

## 2019-11-03 RX ORDER — KETOROLAC TROMETHAMINE 30 MG/ML
15 SYRINGE (ML) INJECTION ONCE
Refills: 0 | Status: DISCONTINUED | OUTPATIENT
Start: 2019-11-03 | End: 2019-11-03

## 2019-11-03 RX ADMIN — Medication 15 MILLIGRAM(S): at 00:58

## 2019-11-04 LAB
CULTURE RESULTS: SIGNIFICANT CHANGE UP
SPECIMEN SOURCE: SIGNIFICANT CHANGE UP

## 2021-02-19 ENCOUNTER — EMERGENCY (EMERGENCY)
Facility: HOSPITAL | Age: 81
LOS: 0 days | Discharge: HOME | End: 2021-02-19
Attending: EMERGENCY MEDICINE | Admitting: EMERGENCY MEDICINE
Payer: MEDICARE

## 2021-02-19 VITALS
OXYGEN SATURATION: 99 % | RESPIRATION RATE: 18 BRPM | TEMPERATURE: 98 F | SYSTOLIC BLOOD PRESSURE: 151 MMHG | HEIGHT: 65 IN | WEIGHT: 139.99 LBS | DIASTOLIC BLOOD PRESSURE: 69 MMHG | HEART RATE: 77 BPM

## 2021-02-19 VITALS
RESPIRATION RATE: 18 BRPM | OXYGEN SATURATION: 98 % | SYSTOLIC BLOOD PRESSURE: 148 MMHG | DIASTOLIC BLOOD PRESSURE: 73 MMHG | HEART RATE: 70 BPM

## 2021-02-19 DIAGNOSIS — Z79.51 LONG TERM (CURRENT) USE OF INHALED STEROIDS: ICD-10-CM

## 2021-02-19 DIAGNOSIS — E78.5 HYPERLIPIDEMIA, UNSPECIFIED: ICD-10-CM

## 2021-02-19 DIAGNOSIS — R10.9 UNSPECIFIED ABDOMINAL PAIN: ICD-10-CM

## 2021-02-19 DIAGNOSIS — I10 ESSENTIAL (PRIMARY) HYPERTENSION: ICD-10-CM

## 2021-02-19 DIAGNOSIS — R51.9 HEADACHE, UNSPECIFIED: ICD-10-CM

## 2021-02-19 DIAGNOSIS — E83.52 HYPERCALCEMIA: ICD-10-CM

## 2021-02-19 DIAGNOSIS — R10.13 EPIGASTRIC PAIN: ICD-10-CM

## 2021-02-19 DIAGNOSIS — Z79.52 LONG TERM (CURRENT) USE OF SYSTEMIC STEROIDS: ICD-10-CM

## 2021-02-19 DIAGNOSIS — Z79.899 OTHER LONG TERM (CURRENT) DRUG THERAPY: ICD-10-CM

## 2021-02-19 DIAGNOSIS — R10.11 RIGHT UPPER QUADRANT PAIN: ICD-10-CM

## 2021-02-19 DIAGNOSIS — Z79.82 LONG TERM (CURRENT) USE OF ASPIRIN: ICD-10-CM

## 2021-02-19 LAB
ALBUMIN SERPL ELPH-MCNC: 4.4 G/DL — SIGNIFICANT CHANGE UP (ref 3.5–5.2)
ALP SERPL-CCNC: 71 U/L — SIGNIFICANT CHANGE UP (ref 30–115)
ALT FLD-CCNC: 13 U/L — SIGNIFICANT CHANGE UP (ref 0–41)
ANION GAP SERPL CALC-SCNC: 8 MMOL/L — SIGNIFICANT CHANGE UP (ref 7–14)
AST SERPL-CCNC: 20 U/L — SIGNIFICANT CHANGE UP (ref 0–41)
BASOPHILS # BLD AUTO: 0.01 K/UL — SIGNIFICANT CHANGE UP (ref 0–0.2)
BASOPHILS NFR BLD AUTO: 0.2 % — SIGNIFICANT CHANGE UP (ref 0–1)
BILIRUB DIRECT SERPL-MCNC: <0.2 MG/DL — SIGNIFICANT CHANGE UP (ref 0–0.2)
BILIRUB INDIRECT FLD-MCNC: >0.7 MG/DL — SIGNIFICANT CHANGE UP (ref 0.2–1.2)
BILIRUB SERPL-MCNC: 0.9 MG/DL — SIGNIFICANT CHANGE UP (ref 0.2–1.2)
BUN SERPL-MCNC: 13 MG/DL — SIGNIFICANT CHANGE UP (ref 10–20)
CALCIUM SERPL-MCNC: 10.6 MG/DL — HIGH (ref 8.5–10.1)
CHLORIDE SERPL-SCNC: 100 MMOL/L — SIGNIFICANT CHANGE UP (ref 98–110)
CO2 SERPL-SCNC: 29 MMOL/L — SIGNIFICANT CHANGE UP (ref 17–32)
CREAT SERPL-MCNC: 0.7 MG/DL — SIGNIFICANT CHANGE UP (ref 0.7–1.5)
EOSINOPHIL # BLD AUTO: 0.05 K/UL — SIGNIFICANT CHANGE UP (ref 0–0.7)
EOSINOPHIL NFR BLD AUTO: 0.9 % — SIGNIFICANT CHANGE UP (ref 0–8)
GLUCOSE SERPL-MCNC: 89 MG/DL — SIGNIFICANT CHANGE UP (ref 70–99)
HCT VFR BLD CALC: 41.1 % — SIGNIFICANT CHANGE UP (ref 37–47)
HGB BLD-MCNC: 14 G/DL — SIGNIFICANT CHANGE UP (ref 12–16)
IMM GRANULOCYTES NFR BLD AUTO: 0.2 % — SIGNIFICANT CHANGE UP (ref 0.1–0.3)
LACTATE SERPL-SCNC: 1 MMOL/L — SIGNIFICANT CHANGE UP (ref 0.7–2)
LIDOCAIN IGE QN: 14 U/L — SIGNIFICANT CHANGE UP (ref 7–60)
LYMPHOCYTES # BLD AUTO: 1.59 K/UL — SIGNIFICANT CHANGE UP (ref 1.2–3.4)
LYMPHOCYTES # BLD AUTO: 27.4 % — SIGNIFICANT CHANGE UP (ref 20.5–51.1)
MCHC RBC-ENTMCNC: 29.5 PG — SIGNIFICANT CHANGE UP (ref 27–31)
MCHC RBC-ENTMCNC: 34.1 G/DL — SIGNIFICANT CHANGE UP (ref 32–37)
MCV RBC AUTO: 86.7 FL — SIGNIFICANT CHANGE UP (ref 81–99)
MONOCYTES # BLD AUTO: 0.49 K/UL — SIGNIFICANT CHANGE UP (ref 0.1–0.6)
MONOCYTES NFR BLD AUTO: 8.4 % — SIGNIFICANT CHANGE UP (ref 1.7–9.3)
NEUTROPHILS # BLD AUTO: 3.65 K/UL — SIGNIFICANT CHANGE UP (ref 1.4–6.5)
NEUTROPHILS NFR BLD AUTO: 62.9 % — SIGNIFICANT CHANGE UP (ref 42.2–75.2)
NRBC # BLD: 0 /100 WBCS — SIGNIFICANT CHANGE UP (ref 0–0)
PLATELET # BLD AUTO: 278 K/UL — SIGNIFICANT CHANGE UP (ref 130–400)
POTASSIUM SERPL-MCNC: 5.3 MMOL/L — HIGH (ref 3.5–5)
POTASSIUM SERPL-SCNC: 5.3 MMOL/L — HIGH (ref 3.5–5)
PROT SERPL-MCNC: 7 G/DL — SIGNIFICANT CHANGE UP (ref 6–8)
RBC # BLD: 4.74 M/UL — SIGNIFICANT CHANGE UP (ref 4.2–5.4)
RBC # FLD: 13.2 % — SIGNIFICANT CHANGE UP (ref 11.5–14.5)
SODIUM SERPL-SCNC: 137 MMOL/L — SIGNIFICANT CHANGE UP (ref 135–146)
TROPONIN T SERPL-MCNC: <0.01 NG/ML — SIGNIFICANT CHANGE UP
WBC # BLD: 5.8 K/UL — SIGNIFICANT CHANGE UP (ref 4.8–10.8)
WBC # FLD AUTO: 5.8 K/UL — SIGNIFICANT CHANGE UP (ref 4.8–10.8)

## 2021-02-19 PROCEDURE — 71045 X-RAY EXAM CHEST 1 VIEW: CPT | Mod: 26

## 2021-02-19 PROCEDURE — 76705 ECHO EXAM OF ABDOMEN: CPT | Mod: 26

## 2021-02-19 PROCEDURE — 99285 EMERGENCY DEPT VISIT HI MDM: CPT

## 2021-02-19 PROCEDURE — 70496 CT ANGIOGRAPHY HEAD: CPT | Mod: 26

## 2021-02-19 PROCEDURE — 70450 CT HEAD/BRAIN W/O DYE: CPT | Mod: 26,59

## 2021-02-19 PROCEDURE — 70498 CT ANGIOGRAPHY NECK: CPT | Mod: 26

## 2021-02-19 RX ORDER — FAMOTIDINE 10 MG/ML
20 INJECTION INTRAVENOUS ONCE
Refills: 0 | Status: COMPLETED | OUTPATIENT
Start: 2021-02-19 | End: 2021-02-19

## 2021-02-19 RX ORDER — FAMOTIDINE 10 MG/ML
1 INJECTION INTRAVENOUS
Qty: 14 | Refills: 0
Start: 2021-02-19 | End: 2021-02-25

## 2021-02-19 RX ORDER — SUCRALFATE 1 G
1 TABLET ORAL ONCE
Refills: 0 | Status: COMPLETED | OUTPATIENT
Start: 2021-02-19 | End: 2021-02-19

## 2021-02-19 RX ADMIN — Medication 1 GRAM(S): at 22:35

## 2021-02-19 RX ADMIN — FAMOTIDINE 20 MILLIGRAM(S): 10 INJECTION INTRAVENOUS at 13:07

## 2021-02-19 NOTE — ED ADULT NURSE NOTE - ALCOHOL PRE SCREEN (AUDIT - C)
Harjeet,     Your blood pressure and exam look good.     I suggest the Shingrix shingles vaccine. This is recommended for everyone 50 years old and above. There are 2 doses 2 to 6 months apart. It is on backorder so call and get on a list at the pharmacy.      I recommend having a Td vaccine. I suggest having this at the pharmacy as many insurance plans do not cover it and it is cheaper at the pharmacy.     Your hand numbness is consistent with carpal tunnel symptoms. I recommend purchasing wrist braces at the pharmacy (for carpal tunnel). Wear them as much as you can day and night. You may take them off if you need to. If this is not helping after several weeks let me know. I can have you see an orthopedist.     Your hip pains are suggestive of arthritis inflammation. We can xray your hips to evaluate them further. Let me know if you decide you would like to do this.     We will check your cholesterol, blood count, kidney and liver function, sugar, and electrolyte level.    Continue to exercise as much as you can to increase endurance.     Please see me in 1 year.    Statement Selected

## 2021-02-19 NOTE — ED PROVIDER NOTE - PROVIDER TOKENS
PROVIDER:[TOKEN:[[id number]],FOLLOWUP:[1-3 Days]],PROVIDER:[TOKEN:[[id number]],FOLLOWUP:[1-3 Days]]

## 2021-02-19 NOTE — ED PROVIDER NOTE - PATIENT PORTAL LINK FT
You can access the FollowMyHealth Patient Portal offered by [facility] by registering at the following website: [email or web address]. By joining [employer]’s FollowMyHealth portal, you will also be able to view your health information using other applications (apps) compatible with our system.

## 2021-02-19 NOTE — ED PROVIDER NOTE - PROGRESS NOTE DETAILS
ATTENDING NOTE: 82 y/o F presents c/o epigastric pain, worse with eating. Vital signs noted, (+) epigastric TTP. Await diagnostic testing. CK: results discussed with patient, educated on signs and symptoms to be aware of that should prompt return to the er. patient verbalizes understanding and will fu with primary care doctor, neurologist and gastroenterologist CK: Results discussed with patient, educated on signs and symptoms to be aware of that should prompt return to the er. patient verbalizes understanding and will fu with primary care doctor, neurologist and gastroenterologist

## 2021-02-19 NOTE — ED PROVIDER NOTE - CARE PROVIDER_API CALL
Yana Manley)  Gastroenterology  [address] [place] [address]  Phone: [phone]  Fax: [phone]  Follow Up Time: 1-3 Days    Frank Rojas)  Neurology  [address] [place] [address], [address]  [place] [address]  Phone: [phone]  Fax: [phone]  Follow Up Time: 1-3 Days

## 2021-02-19 NOTE — ED ADULT NURSE NOTE - CHIEF COMPLAINT QUOTE
pt complaining of B/L eyelid pain x 3-4 weeks, saw opthalmology and was cleared, patient states also for that last month has been experiencing epigastric pain and last night that pain radiated to her chest and back

## 2021-02-19 NOTE — ED PROVIDER NOTE - PHYSICAL EXAMINATION
CONSTITUTIONAL: Well-developed; well-nourished; in no acute distress, nontoxic appearing  SKIN: skin exam is warm and dry,  HEAD: Normocephalic; atraumatic.  ENT: MMM  NECK: ROM intact  CARD: S1, S2 normal, no murmur  RESP: Good air movement bilaterally  ABD: +RUQ/Epigastric TTP, no rebound/guarding. abd soft. no CVAT   EXT: Normal ROM.   NEURO: awake, alert, following commands, oriented, grossly unremarkable. No Focal deficits. GCS 15.   PSYCH: Cooperative, appropriate.

## 2021-02-19 NOTE — ED PROVIDER NOTE - CLINICAL SUMMARY MEDICAL DECISION MAKING FREE TEXT BOX
fs: received this patient as sign out advised to follow up us labs and re-evaluate patient Patient presents for evaluation of epigastric pain at this time described as burning in nature initially ekg labs including troponin ordered and negative after a conversation with the family they brought up concerns of headaches that the patient has been having intermittently for the past several days to weeks and requested ct scan which was obtained ct head negative cta head and neck prelim negative a later conversation with the family took place they were updated and agree with the plan of care, at this time the patient is improved I will discharge with follow up to her pmd patient aware of Ca on cmp

## 2021-02-19 NOTE — ED PROVIDER NOTE - CARE PROVIDERS DIRECT ADDRESSES
[email or web address]

## 2021-02-19 NOTE — ED ADULT TRIAGE NOTE - CHIEF COMPLAINT QUOTE
pt complaining of B/L eyelid pain x 3-4 weeks, saw opthalmology and was cleared, patient states also for that last month has been experiencing epigastric pain and last night that pain radiated to her chest and back pt complaining of B/L eyelid pain x 3-4 weeks, saw opthalmology and was cleared, patient states also for that last month has been experiencing epigastric pain and last night that pain radiated to her chest and back    Denise (son) [phone]

## 2021-02-19 NOTE — ED PROVIDER NOTE - CARE PLAN
Principal Discharge DX:	Abdominal pain  Secondary Diagnosis:	Headache  Secondary Diagnosis:	Hypercalcemia

## 2021-02-19 NOTE — ED ADULT NURSE NOTE - NSIMPLEMENTINTERV_GEN_ALL_ED
Implemented All Universal Safety Interventions:  [place]ark to call system. Call bell, personal items and telephone within reach. Instruct patient to call for assistance. Room bathroom lighting operational. Non-slip footwear when patient is off stretcher. Physically safe environment: no spills, clutter or unnecessary equipment. Stretcher in lowest position, wheels locked, appropriate side rails in place.

## 2021-02-19 NOTE — ED PROVIDER NOTE - NSFOLLOWUPINSTRUCTIONS_ED_ALL_ED_FT
Please follow up with your primary care doctor in 1-3 days  Please be aware of any new or worsening signs or symptoms that should prompt your return to the ER.      Abdominal Pain, Adult  Abdominal pain can be caused by many things. Often, abdominal pain is not serious and it gets better with no treatment or by being treated at home. However, sometimes abdominal pain is serious. Your health care provider will do a medical history and a physical exam to try to determine the cause of your abdominal pain.    Follow these instructions at home:  Take over-the-counter and prescription medicines only as told by your health care provider. Do not take a laxative unless told by your health care provider.  ImageDrink enough fluid to keep your urine clear or pale yellow.  Watch your condition for any changes.  Keep all follow-up visits as told by your health care provider. This is important.  Contact a health care provider if:  Your abdominal pain changes or gets worse.  You are not hungry or you lose weight without trying.  You are constipated or have diarrhea for more than 2–3 days.  You have pain when you urinate or have a bowel movement.  Your abdominal pain wakes you up at night.  Your pain gets worse with meals, after eating, or with certain foods.  You are throwing up and cannot keep anything down.  You have a fever.  Get help right away if:  Your pain does not go away as soon as your health care provider told you to expect.  You cannot stop throwing up.  Your pain is only in areas of the abdomen, such as the right side or the left lower portion of the abdomen.  You have bloody or black stools, or stools that look like tar.  You have severe pain, cramping, or bloating in your abdomen.  You have signs of dehydration, such as:    Dark urine, very little urine, or no urine.  Cracked lips.  Dry mouth.  Sunken eyes.  Sleepiness.  Weakness.    This information is not intended to replace advice given to you by your health care provider. Make sure you discuss any questions you have with your health care provider    Headache    A headache is pain or discomfort felt around the head or neck area. The specific cause of a headache may not be found as there are many types including tension headaches, migraine headaches, and cluster headaches. Watch your condition for any changes. Things you can do to manage your pain include taking over the counter and prescription medications as instructed by your health care provider, lying down in a dark quiet room, limiting stress, getting regular sleep, and refraining from alcohol and tobacco products.    SEEK IMMEDIATE MEDICAL CARE IF YOU EXPERIENCE THE FOLLOWING SYMPTOMS: fever, vomiting, stiff neck, loss of vision, problems with speech, muscle weakness, loss of balance, trouble walking, pass out, or confusion.      HYPERCALCEMIA - AfterCare(R) Instructions(ER/ED)     Hypercalcemia    WHAT YOU NEED TO KNOW:    Hypercalcemia is a high level of calcium in your blood. Calcium levels are kept in balance by your parathyroid glands. Your parathyroid glands are located in your neck near your thyroid gland.Thyroid and Parathyroid Glands         DISCHARGE INSTRUCTIONS:    Medicines:     Medicines may be given to help lower your calcium level or to treat the cause of your hypercalcemia.       Take your medicine as directed. Contact your healthcare provider if you think your medicine is not helping or if you have side effects. Tell him or her if you are allergic to any medicine. Keep a list of the medicines, vitamins, and herbs you take. Include the amounts, and when and why you take them. Bring the list or the pill bottles to follow-up visits. Carry your medicine list with you in case of an emergency.    Follow up with your healthcare provider as directed: Write down your questions so you remember to ask them during your visits.     Self-care:     Drink liquids as directed. Liquids will help prevent dehydration and kidney stones. Ask your healthcare provider how much liquid to drink each day and which liquids are best for you.       Follow your healthcare provider's advice on ways to stay active. Activity can help prevent your hypercalcemia from getting worse.     Contact your healthcare provider if:     You feel confused, or you have trouble concentrating.      You have symptoms of dehydration, such as increased thirst, dark yellow urine, and little or no urine.       You have new or worsening symptoms.       You have questions or concerns about your condition or care.    Return to the emergency department if:     You feel fluttering or jumping in your chest.      You feel like you are going to pass out.       You have nausea and you are vomiting.       You have pain in the middle of your back that moves across to your side, or that spreads to your groin.         © Copyright [employer] 2019 All illustrations and images included in CareNotes are the copyrighted property of [employer]. or [employer].      back to top

## 2021-02-19 NOTE — ED PROVIDER NOTE - NS ED ROS FT
Review of Systems:  	•	CONSTITUTIONAL: no fever, no diaphoresis, no chills  	•	SKIN: no rash  	•	ENT: no sore throat   	•	RESPIRATORY: no shortness of breath, no cough  	•	CARDIAC: no palpitations  	•	GI: +abd pain, no nausea, no vomiting, no diarrhea  	•	GENITO-URINARY: no discharge, no dysuria; no hematuria, no increased urinary frequency  	•	MUSCULOSKELETAL: no joint paint, no swelling, no redness  	•	NEUROLOGIC: no weakness, no headache, no paresthesias

## 2021-02-22 PROBLEM — Z00.00 ENCOUNTER FOR PREVENTIVE HEALTH EXAMINATION: Status: ACTIVE | Noted: 2021-02-22

## 2021-06-28 NOTE — ED PROVIDER NOTE - OBJECTIVE STATEMENT
[facility], [place], operated by [facility] Patient Status:  Hospital Outpatient Surgery    1969 MRN [id number]   Location [address] [facility] Attending Sera Davis MD   Hosp Day # 0 PCP Pietro Harrington rhythm  Abdominal: soft, non-tender; bowel sounds normal; no masses,  no organomegaly  TENDER BULGE RIGHT GROIN BELOW INGUINAL LIGAMENT  Extremities: extremities normal, atraumatic, no cyanosis or edema    Results:   No results found for: WBC, HGB, HCT, PL 81 year old female, past medical history htn, hdl, who presents with epigastric pain. patient endorses intermittent episodes of epigastric pain, +post-prandial. no f/c, nausea/vomiting/diarrhea, SOB, back pain, urinary symptoms. patient followed up with primary care doctor yesterday, scheduled appt for endoscopy and outpatient ultrasound. patient with persistent pain prompting presentation to ed. no hx abdominal surgeries. patient had nuclear stress ~5 years ago, normal. non-smoker.

## 2023-01-02 ENCOUNTER — TRANSCRIPTION ENCOUNTER (OUTPATIENT)
Age: 83
End: 2023-01-02

## 2023-01-02 ENCOUNTER — INPATIENT (INPATIENT)
Facility: HOSPITAL | Age: 83
LOS: 0 days | Discharge: HOME | End: 2023-01-03
Attending: INTERNAL MEDICINE | Admitting: INTERNAL MEDICINE
Payer: MEDICARE

## 2023-01-02 VITALS
HEART RATE: 111 BPM | OXYGEN SATURATION: 97 % | SYSTOLIC BLOOD PRESSURE: 121 MMHG | TEMPERATURE: 98 F | RESPIRATION RATE: 18 BRPM | DIASTOLIC BLOOD PRESSURE: 70 MMHG

## 2023-01-02 LAB
ALBUMIN SERPL ELPH-MCNC: 4.4 G/DL — SIGNIFICANT CHANGE UP (ref 3.5–5.2)
ALP SERPL-CCNC: 67 U/L — SIGNIFICANT CHANGE UP (ref 30–115)
ALT FLD-CCNC: 10 U/L — SIGNIFICANT CHANGE UP (ref 0–41)
ANION GAP SERPL CALC-SCNC: 13 MMOL/L — SIGNIFICANT CHANGE UP (ref 7–14)
APTT BLD: 36 SEC — SIGNIFICANT CHANGE UP (ref 27–39.2)
AST SERPL-CCNC: 14 U/L — SIGNIFICANT CHANGE UP (ref 0–41)
BASOPHILS # BLD AUTO: 0.01 K/UL — SIGNIFICANT CHANGE UP (ref 0–0.2)
BASOPHILS NFR BLD AUTO: 0.1 % — SIGNIFICANT CHANGE UP (ref 0–1)
BILIRUB SERPL-MCNC: 1.1 MG/DL — SIGNIFICANT CHANGE UP (ref 0.2–1.2)
BUN SERPL-MCNC: 19 MG/DL — SIGNIFICANT CHANGE UP (ref 10–20)
CALCIUM SERPL-MCNC: 9.9 MG/DL — SIGNIFICANT CHANGE UP (ref 8.4–10.5)
CHLORIDE SERPL-SCNC: 100 MMOL/L — SIGNIFICANT CHANGE UP (ref 98–110)
CO2 SERPL-SCNC: 25 MMOL/L — SIGNIFICANT CHANGE UP (ref 17–32)
CREAT SERPL-MCNC: 0.8 MG/DL — SIGNIFICANT CHANGE UP (ref 0.7–1.5)
EGFR: 74 ML/MIN/1.73M2 — SIGNIFICANT CHANGE UP
EOSINOPHIL # BLD AUTO: 0.01 K/UL — SIGNIFICANT CHANGE UP (ref 0–0.7)
EOSINOPHIL NFR BLD AUTO: 0.1 % — SIGNIFICANT CHANGE UP (ref 0–8)
GLUCOSE SERPL-MCNC: 159 MG/DL — HIGH (ref 70–99)
HCT VFR BLD CALC: 45.1 % — SIGNIFICANT CHANGE UP (ref 37–47)
HGB BLD-MCNC: 15.1 G/DL — SIGNIFICANT CHANGE UP (ref 12–16)
IMM GRANULOCYTES NFR BLD AUTO: 0.2 % — SIGNIFICANT CHANGE UP (ref 0.1–0.3)
INR BLD: 1.07 RATIO — SIGNIFICANT CHANGE UP (ref 0.65–1.3)
LYMPHOCYTES # BLD AUTO: 0.16 K/UL — LOW (ref 1.2–3.4)
LYMPHOCYTES # BLD AUTO: 1.8 % — LOW (ref 20.5–51.1)
MCHC RBC-ENTMCNC: 29.7 PG — SIGNIFICANT CHANGE UP (ref 27–31)
MCHC RBC-ENTMCNC: 33.5 G/DL — SIGNIFICANT CHANGE UP (ref 32–37)
MCV RBC AUTO: 88.8 FL — SIGNIFICANT CHANGE UP (ref 81–99)
MONOCYTES # BLD AUTO: 0.31 K/UL — SIGNIFICANT CHANGE UP (ref 0.1–0.6)
MONOCYTES NFR BLD AUTO: 3.6 % — SIGNIFICANT CHANGE UP (ref 1.7–9.3)
NEUTROPHILS # BLD AUTO: 8.18 K/UL — HIGH (ref 1.4–6.5)
NEUTROPHILS NFR BLD AUTO: 94.2 % — HIGH (ref 42.2–75.2)
NRBC # BLD: 0 /100 WBCS — SIGNIFICANT CHANGE UP (ref 0–0)
PLATELET # BLD AUTO: 274 K/UL — SIGNIFICANT CHANGE UP (ref 130–400)
POTASSIUM SERPL-MCNC: 3.9 MMOL/L — SIGNIFICANT CHANGE UP (ref 3.5–5)
POTASSIUM SERPL-SCNC: 3.9 MMOL/L — SIGNIFICANT CHANGE UP (ref 3.5–5)
PROT SERPL-MCNC: 6.9 G/DL — SIGNIFICANT CHANGE UP (ref 6–8)
PROTHROM AB SERPL-ACNC: 12.2 SEC — SIGNIFICANT CHANGE UP (ref 9.95–12.87)
RBC # BLD: 5.08 M/UL — SIGNIFICANT CHANGE UP (ref 4.2–5.4)
RBC # FLD: 13.2 % — SIGNIFICANT CHANGE UP (ref 11.5–14.5)
SARS-COV-2 RNA SPEC QL NAA+PROBE: SIGNIFICANT CHANGE UP
SODIUM SERPL-SCNC: 138 MMOL/L — SIGNIFICANT CHANGE UP (ref 135–146)
TROPONIN T SERPL-MCNC: <0.01 NG/ML — SIGNIFICANT CHANGE UP
WBC # BLD: 8.69 K/UL — SIGNIFICANT CHANGE UP (ref 4.8–10.8)
WBC # FLD AUTO: 8.69 K/UL — SIGNIFICANT CHANGE UP (ref 4.8–10.8)

## 2023-01-02 PROCEDURE — 74177 CT ABD & PELVIS W/CONTRAST: CPT | Mod: 26,MA

## 2023-01-02 PROCEDURE — 71045 X-RAY EXAM CHEST 1 VIEW: CPT | Mod: 26

## 2023-01-02 PROCEDURE — 93010 ELECTROCARDIOGRAM REPORT: CPT

## 2023-01-02 PROCEDURE — 99285 EMERGENCY DEPT VISIT HI MDM: CPT

## 2023-01-02 PROCEDURE — 99222 1ST HOSP IP/OBS MODERATE 55: CPT

## 2023-01-02 RX ORDER — SODIUM CHLORIDE 9 MG/ML
1000 INJECTION INTRAMUSCULAR; INTRAVENOUS; SUBCUTANEOUS ONCE
Refills: 0 | Status: COMPLETED | OUTPATIENT
Start: 2023-01-02 | End: 2023-01-02

## 2023-01-02 RX ADMIN — SODIUM CHLORIDE 16.67 MILLILITER(S): 9 INJECTION INTRAMUSCULAR; INTRAVENOUS; SUBCUTANEOUS at 22:18

## 2023-01-02 NOTE — ED PROVIDER NOTE - ATTENDING APP SHARED VISIT CONTRIBUTION OF CARE
82-year-old female past medical history HTN, hyperlipidemia, presents with family from home for evaluation of lower abdominal pain since yesterday.  Patient also with diarrhea all day today.  Patient admits to taking milk of magnesia, which family states she takes twice a week.  Patient also with pressure-like feeling to chest as well as increased belching.  On exam patient in NAD, AAOx3, dry mucous membranes, lungs CTA B/L, no murmur, abdomen is soft nontender nondistended

## 2023-01-02 NOTE — ED PROVIDER NOTE - NS ED ATTENDING STATEMENT MOD
This was a shared visit with the NICOL. I reviewed and verified the documentation and independently performed the documented:

## 2023-01-02 NOTE — ED PROVIDER NOTE - CLINICAL SUMMARY MEDICAL DECISION MAKING FREE TEXT BOX
Daughter-in-law helps with history.  Patient with abdominal pain, diarrhea.  Patient also with chest pressure, tightness.  Patient placed on cardiac monitor.  EKG, labs, and imaging obtained.  Troponin is negative, EKG nonischemic, CT scan reveals fluid feces consistent with diarrhea.  Results discussed with patient and son.  Given patient's chest pressure and increased belching patient and family feel more comfortable with admission given patient's advanced age and past medical history, will admit for rule out ACS

## 2023-01-02 NOTE — ED PROVIDER NOTE - NS ED MD TWO NIGHTS YN
Yes I will STOP taking the medications listed below when I get home from the hospital:    Lasix 20 mg oral tablet  -- 20 milligram(s) by mouth 2 times a day

## 2023-01-02 NOTE — ED PROVIDER NOTE - PHYSICAL EXAMINATION
--EXAM--  VITAL SIGNS: I have reviewed vs documented at present.    CARD: S1, S2, Regular rate and rhythm.   RESP: No wheezes, rales or rhonchi.  ABD: Normal bowel sounds; soft; non-distended; non-tender.

## 2023-01-02 NOTE — ED PROVIDER NOTE - NS ED ROS FT
Review of Systems:  	•	CONSTITUTIONAL - no fever, no diaphoresis, no chills    	•	GI -  abd pain, no nausea, no vomiting,  diarrhea, no constipation  	•	GENITO-URINARY - no discharge, no dysuria; no hematuria, no increased urinary frequency

## 2023-01-02 NOTE — ED ADULT TRIAGE NOTE - NS ED TRIAGE AVPU SCALE
Alert-The patient is alert, awake and responds to voice. The patient is oriented to time, place, and person. The triage nurse is able to obtain subjective information. NAZ Doan

## 2023-01-02 NOTE — ED ADULT NURSE NOTE - NSICDXFAMILYHX_GEN_ALL_CORE_FT
FAMILY HISTORY:  Family history of anxiety disorder    Mother  Still living? No  Family history of anxiety disorder, Age at diagnosis: Age Unknown

## 2023-01-02 NOTE — ED PROVIDER NOTE - OBJECTIVE STATEMENT
This is an 82-year-old female presents to the ED for evaluation of abdominal pain.  As per patient she has pain to the left lower side since yesterday.  Patient also admits to diarrhea all day today.  No fever no chills no nausea no vomiting

## 2023-01-03 ENCOUNTER — TRANSCRIPTION ENCOUNTER (OUTPATIENT)
Age: 83
End: 2023-01-03

## 2023-01-03 VITALS
DIASTOLIC BLOOD PRESSURE: 59 MMHG | SYSTOLIC BLOOD PRESSURE: 105 MMHG | HEART RATE: 74 BPM | OXYGEN SATURATION: 98 % | TEMPERATURE: 98 F | RESPIRATION RATE: 18 BRPM

## 2023-01-03 LAB
ALBUMIN SERPL ELPH-MCNC: 3.9 G/DL — SIGNIFICANT CHANGE UP (ref 3.5–5.2)
ALP SERPL-CCNC: 61 U/L — SIGNIFICANT CHANGE UP (ref 30–115)
ALT FLD-CCNC: 8 U/L — SIGNIFICANT CHANGE UP (ref 0–41)
ANION GAP SERPL CALC-SCNC: 9 MMOL/L — SIGNIFICANT CHANGE UP (ref 7–14)
AST SERPL-CCNC: 11 U/L — SIGNIFICANT CHANGE UP (ref 0–41)
BILIRUB SERPL-MCNC: 0.9 MG/DL — SIGNIFICANT CHANGE UP (ref 0.2–1.2)
BUN SERPL-MCNC: 15 MG/DL — SIGNIFICANT CHANGE UP (ref 10–20)
CALCIUM SERPL-MCNC: 9.4 MG/DL — SIGNIFICANT CHANGE UP (ref 8.4–10.5)
CHLORIDE SERPL-SCNC: 104 MMOL/L — SIGNIFICANT CHANGE UP (ref 98–110)
CK MB CFR SERPL CALC: 1 NG/ML — SIGNIFICANT CHANGE UP (ref 0.6–6.3)
CK MB CFR SERPL CALC: <1 NG/ML — SIGNIFICANT CHANGE UP (ref 0.6–6.3)
CK SERPL-CCNC: 28 U/L — SIGNIFICANT CHANGE UP (ref 0–225)
CK SERPL-CCNC: 40 U/L — SIGNIFICANT CHANGE UP (ref 0–225)
CO2 SERPL-SCNC: 27 MMOL/L — SIGNIFICANT CHANGE UP (ref 17–32)
CREAT SERPL-MCNC: 0.7 MG/DL — SIGNIFICANT CHANGE UP (ref 0.7–1.5)
EGFR: 86 ML/MIN/1.73M2 — SIGNIFICANT CHANGE UP
GLUCOSE SERPL-MCNC: 98 MG/DL — SIGNIFICANT CHANGE UP (ref 70–99)
POTASSIUM SERPL-MCNC: 4 MMOL/L — SIGNIFICANT CHANGE UP (ref 3.5–5)
POTASSIUM SERPL-SCNC: 4 MMOL/L — SIGNIFICANT CHANGE UP (ref 3.5–5)
PROT SERPL-MCNC: 6 G/DL — SIGNIFICANT CHANGE UP (ref 6–8)
SODIUM SERPL-SCNC: 140 MMOL/L — SIGNIFICANT CHANGE UP (ref 135–146)
TROPONIN T SERPL-MCNC: <0.01 NG/ML — SIGNIFICANT CHANGE UP
TROPONIN T SERPL-MCNC: <0.01 NG/ML — SIGNIFICANT CHANGE UP

## 2023-01-03 PROCEDURE — 93010 ELECTROCARDIOGRAM REPORT: CPT

## 2023-01-03 PROCEDURE — 93306 TTE W/DOPPLER COMPLETE: CPT | Mod: 26

## 2023-01-03 PROCEDURE — 99239 HOSP IP/OBS DSCHRG MGMT >30: CPT

## 2023-01-03 PROCEDURE — 99223 1ST HOSP IP/OBS HIGH 75: CPT

## 2023-01-03 PROCEDURE — 99222 1ST HOSP IP/OBS MODERATE 55: CPT

## 2023-01-03 RX ORDER — AMLODIPINE BESYLATE 2.5 MG/1
5 TABLET ORAL DAILY
Refills: 0 | Status: DISCONTINUED | OUTPATIENT
Start: 2023-01-03 | End: 2023-01-03

## 2023-01-03 RX ORDER — PANTOPRAZOLE SODIUM 20 MG/1
40 TABLET, DELAYED RELEASE ORAL EVERY 12 HOURS
Refills: 0 | Status: DISCONTINUED | OUTPATIENT
Start: 2023-01-03 | End: 2023-01-03

## 2023-01-03 RX ORDER — SIMVASTATIN 20 MG/1
20 TABLET, FILM COATED ORAL AT BEDTIME
Refills: 0 | Status: DISCONTINUED | OUTPATIENT
Start: 2023-01-03 | End: 2023-01-03

## 2023-01-03 RX ORDER — ALBUTEROL 90 UG/1
2 AEROSOL, METERED ORAL EVERY 6 HOURS
Refills: 0 | Status: DISCONTINUED | OUTPATIENT
Start: 2023-01-03 | End: 2023-01-03

## 2023-01-03 RX ORDER — ACETAMINOPHEN 500 MG
650 TABLET ORAL EVERY 6 HOURS
Refills: 0 | Status: DISCONTINUED | OUTPATIENT
Start: 2023-01-03 | End: 2023-01-03

## 2023-01-03 RX ORDER — ONDANSETRON 8 MG/1
4 TABLET, FILM COATED ORAL EVERY 8 HOURS
Refills: 0 | Status: DISCONTINUED | OUTPATIENT
Start: 2023-01-03 | End: 2023-01-03

## 2023-01-03 RX ORDER — PANTOPRAZOLE SODIUM 20 MG/1
40 TABLET, DELAYED RELEASE ORAL ONCE
Refills: 0 | Status: COMPLETED | OUTPATIENT
Start: 2023-01-03 | End: 2023-01-03

## 2023-01-03 RX ORDER — LANOLIN ALCOHOL/MO/W.PET/CERES
3 CREAM (GRAM) TOPICAL AT BEDTIME
Refills: 0 | Status: DISCONTINUED | OUTPATIENT
Start: 2023-01-03 | End: 2023-01-03

## 2023-01-03 RX ORDER — SODIUM CHLORIDE 9 MG/ML
1000 INJECTION INTRAMUSCULAR; INTRAVENOUS; SUBCUTANEOUS
Refills: 0 | Status: DISCONTINUED | OUTPATIENT
Start: 2023-01-03 | End: 2023-01-03

## 2023-01-03 RX ORDER — ASPIRIN/CALCIUM CARB/MAGNESIUM 324 MG
81 TABLET ORAL DAILY
Refills: 0 | Status: DISCONTINUED | OUTPATIENT
Start: 2023-01-03 | End: 2023-01-03

## 2023-01-03 RX ORDER — PANTOPRAZOLE SODIUM 20 MG/1
1 TABLET, DELAYED RELEASE ORAL
Qty: 60 | Refills: 1
Start: 2023-01-03

## 2023-01-03 RX ADMIN — PANTOPRAZOLE SODIUM 40 MILLIGRAM(S): 20 TABLET, DELAYED RELEASE ORAL at 08:34

## 2023-01-03 RX ADMIN — PANTOPRAZOLE SODIUM 40 MILLIGRAM(S): 20 TABLET, DELAYED RELEASE ORAL at 10:25

## 2023-01-03 NOTE — CONSULT NOTE ADULT - SUBJECTIVE AND OBJECTIVE BOX
Chief complaint/Reason for consult:    HPI:  82-year-old female presents to the ED for evaluation of left mid abdominal pain and chest pain for 3 days. The chest pain suddenly 3 days ago with intensity of 9/10 mid chest and radiating between the shoulder blade. Pt has a past medical history of HTN, dyslipidemia.  As per patient she has pain to the left lower side since yesterday.  Patient also admits to diarrhea all day today.  No fever no chills no nausea no vomiting or hematemesis. (03 Jan 2023 00:18)    82yFemale      PAST MEDICAL & SURGICAL HISTORY:  Hypertension, unspecified type      Hyperlipidemia      COPD, moderate      No significant past surgical history            Family history:  FAMILY HISTORY:  Family history of anxiety disorder    Family history of anxiety disorder (Mother)      No GI cancers in first or second degree relatives    Social History: No smoking. No alcohol. No illegal drug use.    Allergies:  No Known Allergies    MEDICATIONS: Home Medications:  amLODIPine 5 mg oral tablet: 1 tab(s) orally once a day (27 Sep 2018 13:14)  aspirin 81 mg oral delayed release tablet: 1 tab(s) orally once a day (27 Sep 2018 12:46)  hydroCHLOROthiazide 12.5 mg oral capsule: 1 cap(s) orally once a day (27 Sep 2018 12:51)  simvastatin 20 mg oral tablet: 1 tab(s) orally once a day (at bedtime) (27 Sep 2018 12:46)    MEDICATIONS  (STANDING):  amLODIPine   Tablet 5 milliGRAM(s) Oral daily  aspirin enteric coated 81 milliGRAM(s) Oral daily  hydrochlorothiazide 12.5 milliGRAM(s) Oral daily  pantoprazole  Injectable 40 milliGRAM(s) IV Push every 12 hours  simvastatin 20 milliGRAM(s) Oral at bedtime  sodium chloride 0.9%. 1000 milliLiter(s) (50 mL/Hr) IV Continuous <Continuous>    MEDICATIONS  (PRN):  acetaminophen     Tablet .. 650 milliGRAM(s) Oral every 6 hours PRN Temp greater or equal to 38C (100.4F), Mild Pain (1 - 3)  albuterol    90 MICROgram(s) HFA Inhaler 2 Puff(s) Inhalation every 6 hours PRN Shortness of Breath and/or Wheezing  aluminum hydroxide/magnesium hydroxide/simethicone Suspension 30 milliLiter(s) Oral once PRN Dyspepsia  melatonin 3 milliGRAM(s) Oral at bedtime PRN Insomnia  ondansetron Injectable 4 milliGRAM(s) IV Push every 8 hours PRN Nausea and/or Vomiting        REVIEW OF SYSTEMS  General:  No weight loss, fevers, or chills.  Eyes:  No reported pain or visual changes  ENT:  No sore throat or runny nose.  NECK: No stiffness or lymphadenopathy  CV:  No chest pain or palpitations.  Resp:  No shortness of breath, cough, wheezing or hemoptysis  GI:  No abdominal pain, nausea, vomiting, dysphagia, diarrhea or constipation. No rectal bleeding, melena, or hematemesis.  Muscle:  No aches or weakness  Neuro:  No tingling, numbness       VITALS:   T(F): 97.9 (01-03-23 @ 10:37), Max: 100 (01-02-23 @ 22:17)  HR: 74 (01-03-23 @ 10:37) (74 - 111)  BP: 105/59 (01-03-23 @ 10:37) (105/59 - 121/70)  RR: 18 (01-03-23 @ 10:37) (18 - 18)  SpO2: 98% (01-03-23 @ 10:37) (97% - 98%)    PHYSICAL EXAM:  GENERAL: AAOx3, no acute distress.  HEAD:  Atraumatic, Normocephalic  EYES: conjunctiva and sclera clear  NECK: Supple, No thyromegaly   CHEST/LUNG: Clear to auscultation bilaterally; No wheeze, rhonchi, or rales  HEART: Regular rate and rhythm; normal S1, S2, No murmurs.  ABDOMEN: Soft, nontender, nondistended; Bowel sounds present  NEUROLOGY: No asterixis or tremor  SKIN: Intact, no jaundice          LABS:  01-03    140  |  104  |  15  ----------------------------<  98  4.0   |  27  |  0.7    Ca    9.4      03 Jan 2023 06:24    TPro  6.0  /  Alb  3.9  /  TBili  0.9  /  DBili  x   /  AST  11  /  ALT  8   /  AlkPhos  61  01-03                          15.1   8.69  )-----------( 274      ( 02 Jan 2023 21:05 )             45.1     LIVER FUNCTIONS - ( 03 Jan 2023 06:24 )  Alb: 3.9 g/dL / Pro: 6.0 g/dL / ALK PHOS: 61 U/L / ALT: 8 U/L / AST: 11 U/L / GGT: x           PT/INR - ( 02 Jan 2023 21:05 )   PT: 12.20 sec;   INR: 1.07 ratio         PTT - ( 02 Jan 2023 21:05 )  PTT:36.0 sec    IMAGING:    < from: CT Abdomen and Pelvis w/ IV Cont (01.02.23 @ 22:31) >    ACC: [id number] EXAM:  CT ABDOMEN AND PELVIS IC                          *** ADDENDUM***    There is apparent circumferential wall thickening of the urinary bladder   raising a question of cystitis.    --- End of Report ---    *** END OF ADDENDUM***      PROCEDURE DATE:  01/02/2023          INTERPRETATION:  CLINICAL STATEMENT: Abdominal pain    TECHNIQUE: Contiguous axial CT images were obtained from the lower chest   to the pubic symphysis following administration of intravenous contrast.    Oral contrast was not administered.  Reformatted images in the coronal   and sagittal planes were acquired.    COMPARISON CT: CT abdomen and pelvis 11/2/2019.      FINDINGS:    LOWER CHEST: Unremarkable    HEPATOBILIARY: Stable punctate granulomas.    SPLEEN: Unremarkable.    PANCREAS: Unremarkable.    ADRENAL GLANDS: Unremarkable.    KIDNEYS: Symmetric renal enhancement. No hydronephrosis.    ABDOMINOPELVIC NODES: Unremarkable.    PELVIC ORGANS: Unremarkable.    PERITONEUM/MESENTERY/BOWEL: No evidence of bowel obstruction,  pneumoperitoneum or ascites. Appendix is unremarkable. Small hiatal   hernia. Fluid feces are noted in the right, transverse and descending   colon compatible with diarrhea in the appropriate clinical setting.    BONES/SOFT TISSUES: No acute osseous abnormalities. There are   degenerative changes of the lumbar spine and both hips.    OTHER: Severe atherosclerotic vascular calcifications      IMPRESSION:      Fluid feces in the colon compatible with diarrhea in the appropriate   clinical setting.      --- End of Report ---    ***Please see the addendum at the top of this report. It may contain   additional important information or changes.****        VIKAS BILLY MD; Attending Radiologist  This document has been electronically signed. Jan 2 2023 10:55PM  Addend:VIKAS BILLY MD; Attending Radiologist  This addendum was electronically signed on: Jan 2 2023 11:00PM.    < end of copied text >       Chief complaint/Reason for consult: epigastric pain    HPI:  82-year-old female presents to the ED for evaluation of left mid abdominal pain and chest pain for 3 days. The chest pain suddenly 3 days ago with intensity of 9/10 mid chest and radiating between the shoulder blade. Pt has a past medical history of HTN, dyslipidemia.  As per patient she has pain to the left lower side since yesterday.  Patient also admits to diarrhea all day today.  No fever no chills no nausea no vomiting or hematemesis. (03 Jan 2023 00:18)    GI Updates: 82yFemale [facility] HTN, hyperlipidemia, COPD presents for chest pain. GI consulted for intermittent epigastric pain for 4 months and 4 episodes of diarrhea yesterday. Patient reports pain has resolved and her diarrhea has resolved. Currently Patient denies nausea, vomiting, hematemesis, melena, blood in stool, diarrhea, constipation, abdominal pain. Patient reports GI workup at 3 years ago at [facility] reportedly normal.      PAST MEDICAL & SURGICAL HISTORY:  Hypertension, unspecified type      Hyperlipidemia      COPD, moderate      No significant past surgical history            Family history:  FAMILY HISTORY:  Family history of anxiety disorder    Family history of anxiety disorder (Mother)      No GI cancers in first or second degree relatives    Social History: No smoking. No alcohol. No illegal drug use.    Allergies:  No Known Allergies    MEDICATIONS: Home Medications:  amLODIPine 5 mg oral tablet: 1 tab(s) orally once a day (27 Sep 2018 13:14)  aspirin 81 mg oral delayed release tablet: 1 tab(s) orally once a day (27 Sep 2018 12:46)  hydroCHLOROthiazide 12.5 mg oral capsule: 1 cap(s) orally once a day (27 Sep 2018 12:51)  simvastatin 20 mg oral tablet: 1 tab(s) orally once a day (at bedtime) (27 Sep 2018 12:46)    MEDICATIONS  (STANDING):  amLODIPine   Tablet 5 milliGRAM(s) Oral daily  aspirin enteric coated 81 milliGRAM(s) Oral daily  hydrochlorothiazide 12.5 milliGRAM(s) Oral daily  pantoprazole  Injectable 40 milliGRAM(s) IV Push every 12 hours  simvastatin 20 milliGRAM(s) Oral at bedtime  sodium chloride 0.9%. 1000 milliLiter(s) (50 mL/Hr) IV Continuous <Continuous>    MEDICATIONS  (PRN):  acetaminophen     Tablet .. 650 milliGRAM(s) Oral every 6 hours PRN Temp greater or equal to 38C (100.4F), Mild Pain (1 - 3)  albuterol    90 MICROgram(s) HFA Inhaler 2 Puff(s) Inhalation every 6 hours PRN Shortness of Breath and/or Wheezing  aluminum hydroxide/magnesium hydroxide/simethicone Suspension 30 milliLiter(s) Oral once PRN Dyspepsia  melatonin 3 milliGRAM(s) Oral at bedtime PRN Insomnia  ondansetron Injectable 4 milliGRAM(s) IV Push every 8 hours PRN Nausea and/or Vomiting        REVIEW OF SYSTEMS  General:  No weight loss, fevers, or chills.  Eyes:  No reported pain or visual changes  ENT:  No sore throat or runny nose.  NECK: No stiffness or lymphadenopathy  CV:  No chest pain or palpitations.  Resp:  No shortness of breath, cough, wheezing or hemoptysis  GI:  No abdominal pain, nausea, vomiting, dysphagia, diarrhea or constipation. No rectal bleeding, melena, or hematemesis.  Muscle:  No aches or weakness  Neuro:  No tingling, numbness       VITALS:   T(F): 97.9 (01-03-23 @ 10:37), Max: 100 (01-02-23 @ 22:17)  HR: 74 (01-03-23 @ 10:37) (74 - 111)  BP: 105/59 (01-03-23 @ 10:37) (105/59 - 121/70)  RR: 18 (01-03-23 @ 10:37) (18 - 18)  SpO2: 98% (01-03-23 @ 10:37) (97% - 98%)    PHYSICAL EXAM:  GENERAL: AAOx3, no acute distress.  HEAD:  Atraumatic, Normocephalic  EYES: conjunctiva and sclera clear  NECK: Supple, No thyromegaly   CHEST/LUNG: Clear to auscultation bilaterally; No wheeze, rhonchi, or rales  HEART: Regular rate and rhythm; normal S1, S2, No murmurs.  ABDOMEN: Soft, nontender, nondistended; Bowel sounds present  NEUROLOGY: No asterixis or tremor  SKIN: Intact, no jaundice          LABS:  01-03    140  |  104  |  15  ----------------------------<  98  4.0   |  27  |  0.7    Ca    9.4      03 Jan 2023 06:24    TPro  6.0  /  Alb  3.9  /  TBili  0.9  /  DBili  x   /  AST  11  /  ALT  8   /  AlkPhos  61  01-03                          15.1   8.69  )-----------( 274      ( 02 Jan 2023 21:05 )             45.1     LIVER FUNCTIONS - ( 03 Jan 2023 06:24 )  Alb: 3.9 g/dL / Pro: 6.0 g/dL / ALK PHOS: 61 U/L / ALT: 8 U/L / AST: 11 U/L / GGT: x           PT/INR - ( 02 Jan 2023 21:05 )   PT: 12.20 sec;   INR: 1.07 ratio         PTT - ( 02 Jan 2023 21:05 )  PTT:36.0 sec    IMAGING:    < from: CT Abdomen and Pelvis w/ IV Cont (01.02.23 @ 22:31) >    ACC: [id number] EXAM:  CT ABDOMEN AND PELVIS IC                          *** ADDENDUM***    There is apparent circumferential wall thickening of the urinary bladder   raising a question of cystitis.    --- End of Report ---    *** END OF ADDENDUM***      PROCEDURE DATE:  01/02/2023          INTERPRETATION:  CLINICAL STATEMENT: Abdominal pain    TECHNIQUE: Contiguous axial CT images were obtained from the lower chest   to the pubic symphysis following administration of intravenous contrast.    Oral contrast was not administered.  Reformatted images in the coronal   and sagittal planes were acquired.    COMPARISON CT: CT abdomen and pelvis 11/2/2019.      FINDINGS:    LOWER CHEST: Unremarkable    HEPATOBILIARY: Stable punctate granulomas.    SPLEEN: Unremarkable.    PANCREAS: Unremarkable.    ADRENAL GLANDS: Unremarkable.    KIDNEYS: Symmetric renal enhancement. No hydronephrosis.    ABDOMINOPELVIC NODES: Unremarkable.    PELVIC ORGANS: Unremarkable.    PERITONEUM/MESENTERY/BOWEL: No evidence of bowel obstruction,  pneumoperitoneum or ascites. Appendix is unremarkable. Small hiatal   hernia. Fluid feces are noted in the right, transverse and descending   colon compatible with diarrhea in the appropriate clinical setting.    BONES/SOFT TISSUES: No acute osseous abnormalities. There are   degenerative changes of the lumbar spine and both hips.    OTHER: Severe atherosclerotic vascular calcifications      IMPRESSION:      Fluid feces in the colon compatible with diarrhea in the appropriate   clinical setting.      --- End of Report ---    ***Please see the addendum at the top of this report. It may contain   additional important information or changes.****        VIKAS BILLY MD; Attending Radiologist  This document has been electronically signed. Jan 2 2023 10:55PM  Addend:VIKAS BILLY MD; Attending Radiologist  This addendum was electronically signed on: Jan 2 2023 11:00PM.    < end of copied text >       Chief complaint/Reason for consult: epigastric pain    HPI:  82-year-old female presents to the ED for evaluation of left mid abdominal pain and chest pain for 3 days. The chest pain suddenly 3 days ago with intensity of 9/10 mid chest and radiating between the shoulder blade. Pt has a past medical history of HTN, dyslipidemia.  As per patient she has pain to the left lower side since yesterday.  Patient also admits to diarrhea all day today.  No fever no chills no nausea no vomiting or hematemesis. (03 Jan 2023 00:18)    GI Updates: 82y Female [facility] HTN, hyperlipidemia, COPD presents for chest pain. GI consulted for intermittent epigastric pain for 4 months and 4 episodes of diarrhea yesterday. Patient reports pain has resolved and her diarrhea has resolved. Currently patient denies nausea, vomiting, hematemesis, melena, blood in stool, diarrhea, constipation, abdominal pain. Patient reports GI workup at 3 years ago at [facility] reportedly normal.      PAST MEDICAL & SURGICAL HISTORY:  Hypertension, unspecified type      Hyperlipidemia      COPD, moderate      No significant past surgical history            Family history:  FAMILY HISTORY:  Family history of anxiety disorder    Family history of anxiety disorder (Mother)      No GI cancers in first or second degree relatives    Social History: No smoking. No alcohol. No illegal drug use.    Allergies:  No Known Allergies    MEDICATIONS: Home Medications:  amLODIPine 5 mg oral tablet: 1 tab(s) orally once a day (27 Sep 2018 13:14)  aspirin 81 mg oral delayed release tablet: 1 tab(s) orally once a day (27 Sep 2018 12:46)  hydroCHLOROthiazide 12.5 mg oral capsule: 1 cap(s) orally once a day (27 Sep 2018 12:51)  simvastatin 20 mg oral tablet: 1 tab(s) orally once a day (at bedtime) (27 Sep 2018 12:46)    MEDICATIONS  (STANDING):  amLODIPine   Tablet 5 milliGRAM(s) Oral daily  aspirin enteric coated 81 milliGRAM(s) Oral daily  hydrochlorothiazide 12.5 milliGRAM(s) Oral daily  pantoprazole  Injectable 40 milliGRAM(s) IV Push every 12 hours  simvastatin 20 milliGRAM(s) Oral at bedtime  sodium chloride 0.9%. 1000 milliLiter(s) (50 mL/Hr) IV Continuous <Continuous>    MEDICATIONS  (PRN):  acetaminophen     Tablet .. 650 milliGRAM(s) Oral every 6 hours PRN Temp greater or equal to 38C (100.4F), Mild Pain (1 - 3)  albuterol    90 MICROgram(s) HFA Inhaler 2 Puff(s) Inhalation every 6 hours PRN Shortness of Breath and/or Wheezing  aluminum hydroxide/magnesium hydroxide/simethicone Suspension 30 milliLiter(s) Oral once PRN Dyspepsia  melatonin 3 milliGRAM(s) Oral at bedtime PRN Insomnia  ondansetron Injectable 4 milliGRAM(s) IV Push every 8 hours PRN Nausea and/or Vomiting        REVIEW OF SYSTEMS  General:  No weight loss, fevers, or chills.  Eyes:  No reported pain or visual changes  ENT:  No sore throat or runny nose.  NECK: No stiffness or lymphadenopathy  CV:  No chest pain or palpitations.  Resp:  No shortness of breath, cough, wheezing or hemoptysis  GI:  No abdominal pain, nausea, vomiting, dysphagia, diarrhea or constipation. No rectal bleeding, melena, or hematemesis.  Muscle:  No aches or weakness  Neuro:  No tingling, numbness       VITALS:   T(F): 97.9 (01-03-23 @ 10:37), Max: 100 (01-02-23 @ 22:17)  HR: 74 (01-03-23 @ 10:37) (74 - 111)  BP: 105/59 (01-03-23 @ 10:37) (105/59 - 121/70)  RR: 18 (01-03-23 @ 10:37) (18 - 18)  SpO2: 98% (01-03-23 @ 10:37) (97% - 98%)    PHYSICAL EXAM:  GENERAL: AAOx3, no acute distress.  HEAD:  Atraumatic, Normocephalic  EYES: conjunctiva and sclera clear  NECK: Supple, No thyromegaly   CHEST/LUNG: Clear to auscultation bilaterally; No wheeze, rhonchi, or rales  HEART: Regular rate and rhythm; normal S1, S2, No murmurs.  ABDOMEN: Soft, nontender, nondistended; Bowel sounds present  NEUROLOGY: No asterixis or tremor  SKIN: Intact, no jaundice          LABS:  01-03    140  |  104  |  15  ----------------------------<  98  4.0   |  27  |  0.7    Ca    9.4      03 Jan 2023 06:24    TPro  6.0  /  Alb  3.9  /  TBili  0.9  /  DBili  x   /  AST  11  /  ALT  8   /  AlkPhos  61  01-03                          15.1   8.69  )-----------( 274      ( 02 Jan 2023 21:05 )             45.1     LIVER FUNCTIONS - ( 03 Jan 2023 06:24 )  Alb: 3.9 g/dL / Pro: 6.0 g/dL / ALK PHOS: 61 U/L / ALT: 8 U/L / AST: 11 U/L / GGT: x           PT/INR - ( 02 Jan 2023 21:05 )   PT: 12.20 sec;   INR: 1.07 ratio         PTT - ( 02 Jan 2023 21:05 )  PTT:36.0 sec    IMAGING:    < from: CT Abdomen and Pelvis w/ IV Cont (01.02.23 @ 22:31) >    ACC: [id number] EXAM:  CT ABDOMEN AND PELVIS IC                          *** ADDENDUM***    There is apparent circumferential wall thickening of the urinary bladder   raising a question of cystitis.    --- End of Report ---    *** END OF ADDENDUM***      PROCEDURE DATE:  01/02/2023          INTERPRETATION:  CLINICAL STATEMENT: Abdominal pain    TECHNIQUE: Contiguous axial CT images were obtained from the lower chest   to the pubic symphysis following administration of intravenous contrast.    Oral contrast was not administered.  Reformatted images in the coronal   and sagittal planes were acquired.    COMPARISON CT: CT abdomen and pelvis 11/2/2019.      FINDINGS:    LOWER CHEST: Unremarkable    HEPATOBILIARY: Stable punctate granulomas.    SPLEEN: Unremarkable.    PANCREAS: Unremarkable.    ADRENAL GLANDS: Unremarkable.    KIDNEYS: Symmetric renal enhancement. No hydronephrosis.    ABDOMINOPELVIC NODES: Unremarkable.    PELVIC ORGANS: Unremarkable.    PERITONEUM/MESENTERY/BOWEL: No evidence of bowel obstruction,  pneumoperitoneum or ascites. Appendix is unremarkable. Small hiatal   hernia. Fluid feces are noted in the right, transverse and descending   colon compatible with diarrhea in the appropriate clinical setting.    BONES/SOFT TISSUES: No acute osseous abnormalities. There are   degenerative changes of the lumbar spine and both hips.    OTHER: Severe atherosclerotic vascular calcifications      IMPRESSION:      Fluid feces in the colon compatible with diarrhea in the appropriate   clinical setting.      --- End of Report ---    ***Please see the addendum at the top of this report. It may contain   additional important information or changes.****        VIKAS BILLY MD; Attending Radiologist  This document has been electronically signed. Jan 2 2023 10:55PM  Addend:VIKAS BILLY MD; Attending Radiologist  This addendum was electronically signed on: Jan 2 2023 11:00PM.    < end of copied text >

## 2023-01-03 NOTE — CONSULT NOTE ADULT - SUBJECTIVE AND OBJECTIVE BOX
CARDIOLOGY CONSULT NOTE     CHIEF COMPLAINT/REASON FOR CONSULT:    HPI:  82-year-old female presents to the ED for evaluation of left mid abdominal pain and chest pain for 3 days. The chest pain suddenly 3 days ago with intensity of 9/10 mid chest and radiating between the shoulder blade. Pt has a past medical history of HTN, dyslipidemia.  As per patient she has pain to the left lower side since yesterday.  Patient also admits to diarrhea all day today.  No fever no chills no nausea no vomiting or hematemesis. (03 Jan 2023 00:18)      PAST MEDICAL & SURGICAL HISTORY:  Hypertension, unspecified type      Hyperlipidemia      COPD, moderate      No significant past surgical history          Cardiac Risks:   [x ]HTN, [ ] DM, [ ] Smoking, [x ] FH,  [x ] Lipids        MEDICATIONS:  MEDICATIONS  (STANDING):  amLODIPine   Tablet 5 milliGRAM(s) Oral daily  aspirin enteric coated 81 milliGRAM(s) Oral daily  hydrochlorothiazide 12.5 milliGRAM(s) Oral daily  pantoprazole  Injectable 40 milliGRAM(s) IV Push every 12 hours  simvastatin 20 milliGRAM(s) Oral at bedtime  sodium chloride 0.9%. 1000 milliLiter(s) (50 mL/Hr) IV Continuous <Continuous>      FAMILY HISTORY:  Family history of anxiety disorder    Family history of anxiety disorder (Mother)        SOCIAL HISTORY:      [ ] Marital status    Allergies    No Known Allergies        	    REVIEW OF SYSTEMS:  CONSTITUTIONAL: No fever, weight loss, or fatigue  EYES: No eye pain, visual disturbances, or discharge  ENMT:  No difficulty hearing, tinnitus, vertigo; No sinus or throat pain  NECK: No pain or stiffness  RESPIRATORY: No cough, wheezing, chills or hemoptysis; No Shortness of Breath  CARDIOVASCULAR: See above  GASTROINTESTINAL: See above  GENITOURINARY: No dysuria, frequency, hematuria, or incontinence  NEUROLOGICAL: No headaches, memory loss, loss of strength, numbness, or tremors  SKIN: No itching, burning, rashes, or lesions   	      PHYSICAL EXAM:  T(C): 37.8 (01-02-23 @ 22:17), Max: 37.8 (01-02-23 @ 22:17)  HR: 111 (01-02-23 @ 20:27) (111 - 111)  BP: 121/70 (01-02-23 @ 20:27) (121/70 - 121/70)  RR: 18 (01-02-23 @ 20:27) (18 - 18)  SpO2: 97% (01-02-23 @ 20:27) (97% - 97%)  Wt(kg): --  I&O's Summary      Appearance: Normal	  Psychiatry: A & O x 3, Mood & affect appropriate  HEENT:   Normal oral mucosa, PERRL, EOMI	  Lymphatic: No lymphadenopathy  Cardiovascular: Normal S1 S2,RRR, No JVD, No murmurs  Respiratory: Lungs clear to auscultation	  Gastrointestinal:  Soft,  + BS.  LLQ mild tenderness	  Skin: No rashes, No ecchymoses, No cyanosis	  Neurologic: Non-focal  Extremities: Normal range of motion, No clubbing, cyanosis or edema  Vascular: Peripheral pulses palpable 2+ bilaterally      ECG:  	< from: 12 Lead ECG (01.02.23 @ 21:05) >    Diagnosis Line Sinus tachycardia  Cannot rule out Inferior infarct , age undetermined  Anterolateral infarct , age undetermined  Nonspecific ST and T wave abnormality  Abnormal ECG    Confirmed by Kee Beal ([id number]) on 1/3/2023 7:35:56 AM    < end of copied text >      	  LABS:	 	    CARDIAC MARKERS:                                    15.1   8.69  )-----------( 274      ( 02 Jan 2023 21:05 )             45.1     01-03    140  |  104  |  15  ----------------------------<  98  4.0   |  27  |  0.7    Ca    9.4      03 Jan 2023 06:24    TPro  6.0  /  Alb  3.9  /  TBili  0.9  /  DBili  x   /  AST  11  /  ALT  8   /  AlkPhos  61  01-03    PT/INR - ( 02 Jan 2023 21:05 )   PT: 12.20 sec;   INR: 1.07 ratio         PTT - ( 02 Jan 2023 21:05 )  PTT:36.0 sec

## 2023-01-03 NOTE — DISCHARGE NOTE PROVIDER - ATTENDING DISCHARGE PHYSICAL EXAMINATION:
Patient seen at bedside. Son at bedside. Patient wants to go home. Offered UA but patient wants to do as outpatient. does not want UA to be done inpatient. patient was advised to follow up with PCP and GI. patient/son understood and agreement with discharge planning.  Patient seen at bedside. Son at bedside. Patient wants to go home. Offered UA but patient wants to do as outpatient. patient does not have leucocytosis, fever or any burning urination. imaging suspicious for UTI?. does not want UA to be done inpatient. patient was advised to follow up with PCP and GI. patient/son understood and agreement with discharge planning.  Patient seen at bedside. Son at bedside. Patient wants to go home. GI and cardiology cleared for discharge. Offered UA but patient wants to do as outpatient. patient does not have leucocytosis, fever or any burning urination. imaging suspicious for UTI?. does not want UA to be done inpatient. patient was advised to follow up with PCP and GI. patient/son understood and agreement with discharge planning.

## 2023-01-03 NOTE — DISCHARGE NOTE PROVIDER - NSDCCPCAREPLAN_GEN_ALL_CORE_FT
Patient: Nirav Marino    Procedure Summary     Date: 04/14/22 Room / Location: [address] [place] [address] MAIN OR 14 / [address] [place] [address] MAIN OR    Anesthesia Start: 3646 Anesthesia Stop:     Procedure: CYSTOSCOPY TRANSURETHRAL RESECTION BLADDER TUMOR (N/A ) Diagnosis:       Malignant neoplasm of bladder wall ([address]      (Malignant neoplasm of bladder wall ([address] [C67.9])    Surgeons: Wes Womack MD Anesthesiologist: Nathaniel Mercado MD    Anesthesia Type: general ASA Status: 3          Anesthesia Type: general    Vitals Value Taken Time   /97 04/14/22 1235   Temp 97.3 04/14/22 1235   Pulse 78 04/14/22 1235   Resp 14 04/14/22 1235   SpO2 100 04/14/22 1235       [address] [place] [address] AN Post Evaluation:   Patient Evaluated in PACU  Patient Participation: complete - patient participated  Level of Consciousness: awake  Pain Score: 0  Pain Management: adequate  Airway Patency:patent  Dental exam unchanged from preop  Yes    Cardiovascular Status: acceptable  Respiratory Status: acceptable  Postoperative Hydration acceptable      [address], CRNA  4/14/2022 12:35 PM
PRINCIPAL DISCHARGE DIAGNOSIS  Diagnosis: Chest pain  Assessment and Plan of Treatment: Acute cardiac event ruled out, follow up with PMD      SECONDARY DISCHARGE DIAGNOSES  Diagnosis: Gastritis  Assessment and Plan of Treatment: Follow up with GI at [facility] or your own Gastroenterologist as outpatient , continue Protonix, avoid NSAID'S: ex:  Motrin or Alleve

## 2023-01-03 NOTE — PROGRESS NOTE ADULT - SUBJECTIVE AND OBJECTIVE BOX
JASWANT, MAGDALENA  82y, Female  Allergy: No Known Allergies    Hospital Day: 1d    Patient seen and examined earlier today.   no major complaints. episode fo diarrhea before admission     PMH/PSH:  PAST MEDICAL & SURGICAL HISTORY:  Hypertension, unspecified type      Hyperlipidemia      COPD, moderate      No significant past surgical history          LAST 24-Hr EVENTS:    VITALS:  T(F): 97.9 (01-03-23 @ 10:37), Max: 100 (01-02-23 @ 22:17)  HR: 74 (01-03-23 @ 10:37)  BP: 105/59 (01-03-23 @ 10:37) (105/59 - 121/70)  RR: 18 (01-03-23 @ 10:37)  SpO2: 98% (01-03-23 @ 10:37)          TESTS & MEASUREMENTS:  Weight/BMI                            15.1   8.69  )-----------( 274      ( 02 Jan 2023 21:05 )             45.1     PT/INR - ( 02 Jan 2023 21:05 )   PT: 12.20 sec;   INR: 1.07 ratio         PTT - ( 02 Jan 2023 21:05 )  PTT:36.0 sec  INR: 1.07 ratio (01-02-23 @ 21:05)    01-03    140  |  104  |  15  ----------------------------<  98  4.0   |  27  |  0.7    Ca    9.4      03 Jan 2023 06:24    TPro  6.0  /  Alb  3.9  /  TBili  0.9  /  DBili  x   /  AST  11  /  ALT  8   /  AlkPhos  61  01-03    LIVER FUNCTIONS - ( 03 Jan 2023 06:24 )  Alb: 3.9 g/dL / Pro: 6.0 g/dL / ALK PHOS: 61 U/L / ALT: 8 U/L / AST: 11 U/L / GGT: x           CARDIAC MARKERS ( 03 Jan 2023 06:24 )  x     / <0.01 ng/mL / 28 U/L / x     / <1.0 ng/mL  CARDIAC MARKERS ( 02 Jan 2023 21:05 )  x     / <0.01 ng/mL / x     / x     / x                      COVID-19 PCR: NotDetec (01-02-23 @ 21:00)                RADIOLOGY, ECG, & ADDITIONAL TESTS:  12 Lead ECG:   Ventricular Rate 74 BPM    Atrial Rate 74 BPM    P-R Interval 144 ms    QRS Duration 72 ms    Q-T Interval 368 ms    QTC Calculation(Bazett) 408 ms    R Axis 179 degrees    T Axis 210 degrees    Diagnosis Line Normal sinus rhythm  Left posterior fascicular block  Possible Inferior infarct , age undetermined  ST & T wave abnormality, consider lateral ischemia  Abnormal ECG    Confirmed by Kee Beal ([id number]) on 1/3/2023 12:45:30 PM (01-03-23 @ 10:07)      RECENT DIAGNOSTIC ORDERS:  GI PCR Panel Stool: Routine (01-03-23 @ 04:12)  Culture - Stool: Routine  Specimen Source: Feces (01-03-23 @ 04:12)  Ova and Parasites: Routine  Specimen Source: Feces (01-03-23 @ 04:12)  Troponin T, Serum: 15:00 (01-03-23 @ 00:32)  CKMB Units: 15:00 (01-03-23 @ 00:32)  Creatine Kinase, Serum: 15:00 (01-03-23 @ 00:32)  Clostridium difficile Toxin by PCR: Routine (01-03-23 @ 00:32)  Diet, Clear Liquid (01-03-23 @ 00:32)  Urinalysis: STAT (01-02-23 @ 22:30)      MEDICATIONS:  MEDICATIONS  (STANDING):  amLODIPine   Tablet 5 milliGRAM(s) Oral daily  aspirin enteric coated 81 milliGRAM(s) Oral daily  hydrochlorothiazide 12.5 milliGRAM(s) Oral daily  pantoprazole  Injectable 40 milliGRAM(s) IV Push every 12 hours  simvastatin 20 milliGRAM(s) Oral at bedtime  sodium chloride 0.9%. 1000 milliLiter(s) (50 mL/Hr) IV Continuous <Continuous>    MEDICATIONS  (PRN):  acetaminophen     Tablet .. 650 milliGRAM(s) Oral every 6 hours PRN Temp greater or equal to 38C (100.4F), Mild Pain (1 - 3)  albuterol    90 MICROgram(s) HFA Inhaler 2 Puff(s) Inhalation every 6 hours PRN Shortness of Breath and/or Wheezing  aluminum hydroxide/magnesium hydroxide/simethicone Suspension 30 milliLiter(s) Oral once PRN Dyspepsia  melatonin 3 milliGRAM(s) Oral at bedtime PRN Insomnia  ondansetron Injectable 4 milliGRAM(s) IV Push every 8 hours PRN Nausea and/or Vomiting      HOME MEDICATIONS:  amLODIPine 5 mg oral tablet (09-27)  aspirin 81 mg oral delayed release tablet (09-27)  hydroCHLOROthiazide 12.5 mg oral capsule (09-27)  levoFLOXacin 750 mg oral tablet (09-27)  Pepcid AC Maximum Strength 20 mg oral tablet (02-19)  predniSONE 20 mg oral tablet (09-27)  ProAir HFA 90 mcg/inh inhalation aerosol (09-27)  simvastatin 20 mg oral tablet (09-27)      PHYSICAL EXAM:  GENERAL: Patient lying on bed, comfortable   CHEST/LUNG: NVB, no wheezing   HEART: R1+R2, RRR  ABDOMEN: Soft. non tender, BS positive  EXTREMITIES:  no edema, Bruising  CNS: AAAx4. No cranial nerves deficit.

## 2023-01-03 NOTE — DISCHARGE NOTE PROVIDER - HOSPITAL COURSE
82-year-old female presents to the ED for evaluation of left mid abdominal pain and chest pain for 3 days. The chest pain suddenly 3 days ago with intensity of 9/10 mid chest and radiating between the shoulder blade. Pt has a past medical history of HTN, dyslipidemia.  As per patient she has pain to the left lower side since yesterday.  Patient also admits to diarrhea all day today.  No fever no chills no nausea no vomiting or hematemesi      #Atypical chest pain /r/o  ACS r/o GI etiology / PUD vs GERD  Tele  Cardiology, following   TTE. EF 68%, moderate AR  ASA   trop negative x2    #Diarrhea  IVF  Gastroenterology consulted  protonix  trial of maalox   r/o c dif:  GI pcr ordered  Stool ova and parasite ordered  stool culture ordered      #Problem 1-Epigastric pain on and off for 6 months--->resolved  ddx PUD, gastritis  Rec  -Avoid n-saids  -PPI BID  -outpatient EGD  -diet as tolerated   - Follow up with [facility] located at [address] [place] [address]. Phone Number: [phone]   continue home meds as per PMD       82-year-old female presents to the ED for evaluation of left mid abdominal pain and chest pain for 3 days. The chest pain suddenly 3 days ago with intensity of 9/10 mid chest and radiating between the shoulder blade. Pt has a past medical history of HTN, dyslipidemia.  As per patient she has pain to the left lower side since yesterday.  Patient also admits to diarrhea all day today.  No fever no chills no nausea no vomiting or hematemesi      #Atypical chest pain /r/o  ACS r/o GI etiology / PUD vs GERD  Tele  Cardiology, following   TTE. EF 68%, moderate AR  ASA   trop negative x2    #Diarrhea  IVF  Gastroenterology consulted  protonix  trial of maalox   r/o c dif:  GI pcr ordered  Stool ova and parasite ordered  stool culture ordered      #Problem 2 Epigastric pain on and off for 6 months--->resolved  ddx PUD, gastritis  Rec  -Avoid n-saids  -PPI BID  -outpatient EGD  -diet as tolerated   - Follow up with our [facility] located at [address] [place] [address]. Phone Number: [phone]   continue home meds as per PMD        Problem 3-There is apparent circumferential wall thickening of the urinary bladder   raising a question of cystitis.  Rec   - Care as per primary team        82-year-old female presents to the ED for evaluation of left mid abdominal pain and chest pain for 3 days. The chest pain suddenly 3 days ago with intensity of 9/10 mid chest and radiating between the shoulder blade. Pt has a past medical history of HTN, dyslipidemia.  As per patient she has pain to the left lower side since yesterday.  Patient also admits to diarrhea all day today.  No fever no chills no nausea no vomiting or hematemesi      #Atypical chest pain /r/o  ACS r/o GI etiology / PUD vs GERD  Tele  Cardiology, following   TTE. EF 68%, moderate AR  ASA   trop negative x2    #Diarrhea, resolved   IVF  Gastroenterology consulted cleared for discharge        #Problem 2 Epigastric pain on and off for 6 months--->resolved  ddx PUD, gastritis  Rec  -Avoid n-saids  -PPI BID  -outpatient EGD  -diet as tolerated   - Follow up with our [facility] located at [address] [place] [address]. Phone Number: [phone]   continue home meds as per PMD        Problem 3-There is apparent circumferential wall thickening of the urinary bladder   raising a question of cystitis.  Rec   - Care as per primary team

## 2023-01-03 NOTE — DISCHARGE NOTE PROVIDER - PROVIDER TOKENS
FREE:[LAST:[[facility]],PHONE:[phone]],FAX:[(   )    -],ADDRESS:[[address] [place] [address]]],FREE:[LAST:[Private MD],PHONE:[(   )    -],FAX:[(   )    -],FOLLOWUP:[2 weeks]]

## 2023-01-03 NOTE — DISCHARGE NOTE PROVIDER - NSDCMRMEDTOKEN_GEN_ALL_CORE_FT
amLODIPine 5 mg oral tablet: 1 tab(s) orally once a day  hydroCHLOROthiazide 12.5 mg oral capsule: 1 cap(s) orally once a day  ProAir HFA 90 mcg/inh inhalation aerosol: 2 puff(s) inhaled every 8 hours, As Needed for bronchospasm  Protonix 40 mg oral delayed release tablet: 1 tab(s) orally 2 times a day   simvastatin 20 mg oral tablet: 1 tab(s) orally once a day (at bedtime)

## 2023-01-03 NOTE — H&P ADULT - NSHPPHYSICALEXAM_GEN_ALL_CORE
GENERAL:  83y/o Female NAD, resting comfortably.  HEAD:  Atraumatic, Normocephalic  EYES: EOMI, PERRLA, conjunctiva and sclera clear  NECK: Supple, No JVD, no cervical lymphadenopathy, non-tender  CHEST/LUNG: Clear to auscultation bilaterally; No wheeze, rhonchi, or rales  HEART: Regular rate and rhythm; S1&S2  ABDOMEN: Soft, Nontender, Nondistended x 4 quadrants; Bowel sounds present  EXTREMITIES:   Peripheral Pulses Present, No clubbing, no cyanosis, or no edema, no calf tenderness  PSYCH: AAOx3, cooperative, appropriate  NEUROLOGY: WNL  SKIN: WNL GENERAL:  83y/o Female NAD, resting comfortably.  HEAD:  Atraumatic, Normocephalic  EYES: EOMI, PERRLA, conjunctiva and sclera clear  NECK: Supple, No JVD, no cervical lymphadenopathy, non-tender  CHEST/LUNG: Clear to auscultation bilaterally; No wheeze, rhonchi, or rales  HEART: Regular rate and rhythm; S1&S2  ABDOMEN: Soft, tender to palpation on left mid abdomen, Nondistended x 4 quadrants; Bowel sounds present  EXTREMITIES:   Peripheral Pulses Present, No clubbing, no cyanosis, or no edema, no calf tenderness  PSYCH: AAOx3, cooperative, appropriate  NEUROLOGY: WNL  SKIN: WNL

## 2023-01-03 NOTE — H&P ADULT - ASSESSMENT
chest pain /r/o  ACS  Tele  Cardiology  TTE  ASA  Am EKG    epigastric pain  Gastroenterology  protoninx    Diarrhea  IVF  r/o c dif  pcr chest pain /r/o  ACS  Tele  Cardiology  TTE  ASA  Am EKG    epigastric pain  Gastroenterology  protonix    Diarrhea  IVF  r/o c dif  pcr    continue home meds as per PMD    Prophylaxis GI/VTE    CAse D/W Dr Reddy #Atypical chest pain /r/o  ACS r/o GI etiology / PUD vs GERD  Tele  Cardiology  TTE  ASA  Initial trop negative, serial CE  EKG not ischemic  Am EKG  Gastroenterology consult pending  protonix  trial of maalox     #Diarrhea  IVF  r/o c dif  GI pcr  Stool ova and parasite  stool culture    continue home meds as per PMD    Prophylaxis GI/VTE    CAse D/W Dr Reddy

## 2023-01-03 NOTE — CONSULT NOTE ADULT - ASSESSMENT
82-year-old female presents to the ED for evaluation of left mid abdominal pain and chest pain for 3 days.   Patient also admits to diarrhea. Chest pain only when abd pain. No  now. MI r/O. Pain appears GI. Consider GI W/u
82yFemale [facility] HTN, hyperlipidemia, COPD presents for chest pain. GI consulted for intermittent epigastric pain for 4 months and 4 episodes of diarrhea yesterday. Patient reports pain has resolved and her diarrhea has resolved      Problem 1-Epigastric pain on and off for 6 months--->resolved  ddx PUD, gastritis  Rec  -Avoid n-saids  -PPI BID  -outpatient EGD  -diet as tolerated   - Follow up with our [facility] located at [address] [place] [address]. Phone Number: [phone]     Problem 2-Fluid feces in the colon compatible with diarrhea in the appropriate   clinical setting.  patient reported diarrhea yesterday resolved today  Rec  -if diarrhea returns check C-diff and GI PCR  -lactose free low residue diet    Problem 3-There is apparent circumferential wall thickening of the urinary bladder   raising a question of cystitis.  Rec   - Care as per primary team

## 2023-01-03 NOTE — CONSULT NOTE ADULT - NS ATTEND AMEND GEN_ALL_CORE FT
Note reviewed and edited as needed. Pt reporting intermittent epigastric discomfort and bloating for several months, currently feeling better, no further diarrhea. If has loose stool recommend check stool c difficile, GI PCR. If symptoms do not recur can resume outpt GI follow up to discuss endoscopic evaluation.

## 2023-01-03 NOTE — DISCHARGE NOTE PROVIDER - NSDCFUADDAPPT_GEN_ALL_CORE_FT
Follow up with PCP. You need a Urine analysis to check for urinary infection,   follow u[ with GI as scheduled

## 2023-01-03 NOTE — PROGRESS NOTE ADULT - ASSESSMENT
#Atypical chest pain /r/o  ACS r/o GI etiology / PUD vs GERD  Tele  Cardiology, following   TTE. EF 68%  ASA  repeat trop negative   Gastroenterology consult pending  protonix  trial of maalox     #Diarrhea  IVF  r/o c dif  GI pcr  Stool ova and parasite  stool culture    continue home meds as per PMD    Prophylaxis GI/VTE    follow up: follow GI cardiology, follow C diff, GI PCT stool

## 2023-01-03 NOTE — H&P ADULT - HISTORY OF PRESENT ILLNESS
82-year-old female presents to the ED for evaluation of abdominal pain.  As per patient she has pain to the left lower side since yesterday.  Patient also admits to diarrhea all day today.  No fever no chills no nausea no vomiting 82-year-old female presents to the ED for evaluation of left mid abdominal pain and chest pain for 3 days. The chest pain suddenly 3 days ago with intensity of 9/10 mid chest and radiating between the shoulder blade. Pt has a past medical history of HTN, dyslipidemia.  As per patient she has pain to the left lower side since yesterday.  Patient also admits to diarrhea all day today.  No fever no chills no nausea no vomiting or hematemesis.

## 2023-01-03 NOTE — DISCHARGE NOTE PROVIDER - CARE PROVIDER_API CALL
[facility],   [address] [place]  [address]  Phone: [phone]  Fax: (   )    -  Follow Up Time:     Private MD,   Phone: (   )    -  Fax: (   )    -  Follow Up Time: 2 weeks

## 2023-01-03 NOTE — H&P ADULT - NSHPLABSRESULTS_GEN_ALL_CORE
ICU Vital Signs Last 24 Hrs  T(C): 37.8 (02 Jan 2023 22:17), Max: 37.8 (02 Jan 2023 22:17)  T(F): 100 (02 Jan 2023 22:17), Max: 100 (02 Jan 2023 22:17)  HR: 111 (02 Jan 2023 20:27) (111 - 111)  BP: 121/70 (02 Jan 2023 20:27) (121/70 - 121/70)  BP(mean): --  ABP: --  ABP(mean): --  RR: 18 (02 Jan 2023 20:27) (18 - 18)  SpO2: 97% (02 Jan 2023 20:27) (97% - 97%)    O2 Parameters below as of 02 Jan 2023 20:27  Patient On (Oxygen Delivery Method): room air                                15.1   8.69  )-----------( 274      ( 02 Jan 2023 21:05 )             45.1       01-02    138  |  100  |  19  ----------------------------<  159<H>  3.9   |  25  |  0.8    Ca    9.9      02 Jan 2023 21:05    TPro  6.9  /  Alb  4.4  /  TBili  1.1  /  DBili  x   /  AST  14  /  ALT  10  /  AlkPhos  67  01-02                  PT/INR - ( 02 Jan 2023 21:05 )   PT: 12.20 sec;   INR: 1.07 ratio         PTT - ( 02 Jan 2023 21:05 )  PTT:36.0 sec    Lactate Trend      CARDIAC MARKERS ( 02 Jan 2023 21:05 )  x     / <0.01 ng/mL / x     / x     / x            CAPILLARY BLOOD GLUCOSE

## 2023-01-09 DIAGNOSIS — R07.9 CHEST PAIN, UNSPECIFIED: ICD-10-CM

## 2023-01-09 DIAGNOSIS — K29.70 GASTRITIS, UNSPECIFIED, WITHOUT BLEEDING: ICD-10-CM

## 2023-01-09 DIAGNOSIS — E78.5 HYPERLIPIDEMIA, UNSPECIFIED: ICD-10-CM

## 2023-01-09 DIAGNOSIS — I10 ESSENTIAL (PRIMARY) HYPERTENSION: ICD-10-CM

## 2023-01-09 DIAGNOSIS — Z79.82 LONG TERM (CURRENT) USE OF ASPIRIN: ICD-10-CM

## 2023-05-08 NOTE — ED ADULT NURSE NOTE - CHPI ED NUR SYMPTOMS POS
[Recent Change In Weight] : ~T recent weight change [Negative] : Gastrointestinal [FreeTextEntry8] : +dark urine (resolved) PAIN/CONSTIPATION

## 2023-07-09 ENCOUNTER — EMERGENCY (EMERGENCY)
Facility: HOSPITAL | Age: 83
LOS: 0 days | Discharge: HOME | End: 2023-07-09
Attending: EMERGENCY MEDICINE
Payer: MEDICARE

## 2023-07-09 VITALS
DIASTOLIC BLOOD PRESSURE: 61 MMHG | SYSTOLIC BLOOD PRESSURE: 128 MMHG | RESPIRATION RATE: 18 BRPM | HEART RATE: 72 BPM | OXYGEN SATURATION: 98 %

## 2023-07-09 VITALS
HEART RATE: 84 BPM | HEIGHT: 65 IN | RESPIRATION RATE: 18 BRPM | TEMPERATURE: 97 F | OXYGEN SATURATION: 97 % | SYSTOLIC BLOOD PRESSURE: 141 MMHG | DIASTOLIC BLOOD PRESSURE: 73 MMHG | WEIGHT: 139.99 LBS

## 2023-07-09 DIAGNOSIS — J44.9 CHRONIC OBSTRUCTIVE PULMONARY DISEASE, UNSPECIFIED: ICD-10-CM
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
## 2023-07-09 DIAGNOSIS — M25.551 PAIN IN RIGHT HIP: ICD-10-CM

## 2023-07-09 DIAGNOSIS — I10 ESSENTIAL (PRIMARY) HYPERTENSION: ICD-10-CM

## 2023-07-09 DIAGNOSIS — E78.5 HYPERLIPIDEMIA, UNSPECIFIED: ICD-10-CM

## 2023-07-09 DIAGNOSIS — T84.020A DISLOCATION OF INTERNAL RIGHT HIP PROSTHESIS, INITIAL ENCOUNTER: ICD-10-CM
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
## 2023-07-09 DIAGNOSIS — Z96.641 PRESENCE OF RIGHT ARTIFICIAL HIP JOINT: ICD-10-CM

## 2023-07-09 PROCEDURE — 73502 X-RAY EXAM HIP UNI 2-3 VIEWS: CPT | Mod: RT

## 2023-07-09 PROCEDURE — 96375 TX/PRO/DX INJ NEW DRUG ADDON: CPT | Mod: XU

## 2023-07-09 PROCEDURE — 27265 TREAT HIP DISLOCATION: CPT | Mod: RT

## 2023-07-09 PROCEDURE — 99285 EMERGENCY DEPT VISIT HI MDM: CPT | Mod: 25

## 2023-07-09 PROCEDURE — 73502 X-RAY EXAM HIP UNI 2-3 VIEWS: CPT | Mod: 26,RT

## 2023-07-09 PROCEDURE — 96374 THER/PROPH/DIAG INJ IV PUSH: CPT | Mod: XU

## 2023-07-09 PROCEDURE — 99284 EMERGENCY DEPT VISIT MOD MDM: CPT

## 2023-07-09 RX ORDER — PROPOFOL 10 MG/ML
40 INJECTION, EMULSION INTRAVENOUS ONCE
Refills: 0 | Status: COMPLETED | OUTPATIENT
Start: 2023-07-09 | End: 2023-07-09

## 2023-07-09 RX ORDER — MORPHINE SULFATE 50 MG/1
6 CAPSULE, EXTENDED RELEASE ORAL ONCE
Refills: 0 | Status: DISCONTINUED | OUTPATIENT
Start: 2023-07-09 | End: 2023-07-09

## 2023-07-09 RX ORDER — SODIUM CHLORIDE 9 MG/ML
1000 INJECTION INTRAMUSCULAR; INTRAVENOUS; SUBCUTANEOUS ONCE
Refills: 0 | Status: COMPLETED | OUTPATIENT
Start: 2023-07-09 | End: 2023-07-09

## 2023-07-09 RX ADMIN — MORPHINE SULFATE 6 MILLIGRAM(S): 50 CAPSULE, EXTENDED RELEASE ORAL at 11:27

## 2023-07-09 RX ADMIN — PROPOFOL 40 MILLIGRAM(S): 10 INJECTION, EMULSION INTRAVENOUS at 12:39

## 2023-07-09 RX ADMIN — SODIUM CHLORIDE 2000 MILLILITER(S): 9 INJECTION INTRAMUSCULAR; INTRAVENOUS; SUBCUTANEOUS at 12:37

## 2023-07-09 NOTE — ED PROVIDER NOTE - NSFOLLOWUPINSTRUCTIONS_ED_ALL_ED_FT
wear knee immobilizer, see your orthopedic MD this week as planned wear knee immobilizer, see your orthopedic MD this week as planned    Procedural Sedation    During your Emergency Department visit, you were given medication to help relax you and alleviate pain to aid in a diagnostic or therapeutic procedure. The medication given is typically short-acting but may have some lingering effects for up to 48 hours. Do not be alone - have a responsible adult stay with you until you are awake and alert. Do not drive or operate heavy machinery for the next 24 hours. Do not drink alcohol or smoke or take medicines that cause drowsiness. Do not make important decisions or sign legal documents or take care of children on your own.    SEEK IMMEDIATE MEDICAL CARE IF YOU HAVE ANY OF THE FOLLOWING SYMPTOMS: trouble breathing, confusion, inability to urinate, severe pain, rash, lightheadedness/dizziness, or fainting.

## 2023-07-09 NOTE — ED PROVIDER NOTE - ATTENDING APP SHARED VISIT CONTRIBUTION OF CARE
I have personally performed a history and physical exam on this patient and personally directed the management of the patient. Patient is a 83-year-old female status post hip replacement 3 months ago done at another facility with no complications presents for evaluation of right hip pain after a twisting injury while in the shower no fevers no chills denies any other pain at this time did not sustain any LOC or vomiting denies any chest pain shortness of breath or palpitations no fevers no chills    On physical exam patient has obvious deformity of right lower extremity patient is internally rotated however pedal pulses 2+ and equal capillary refills normal most consistent with dislocation no other pain noted at this time no other signs of trauma    Assessment plan we obtained x-rays confirm dislocation patient had successful reduction with use of sedation here in the emergency department she was observed for sedation protocols patient actually greatly improved0 we applied a knee immobilizer and discharged  advised to follow-up with orthopedics in the next 24 to 48 hours post procedure is consistent pedal pulses 2+ and equal capillary refills normal

## 2023-07-09 NOTE — ED PROCEDURE NOTE - CPROC ED PHYSICIAN PRESENCE1
[Dear  ___] : Dear  [unfilled], [FreeTextEntry2] : Trey Kelly MD\par [address], [address]\par [place] [id number] [FreeTextEntry1] : I saw Mr Josias Norris for follow-up. He is doing well and remains stable after his right carotid endarterectomy back in 2016.\par \par Attached, you will find his carotid duplex report along with my complete EMR office note for your records.\par \par If you have any questions, please do not hesitate to contact us.  [FreeTextEntry3] : Sincerely, \par \par Cris Purcell, MS, AGACNP-BC\par Nurse Practitioner to Dr. Brandon Pearl\par \par Surgical Services\par [facility]\par [facility]\par , Dept. of Surgery\par [facility] \par [address], [address] [place] [address]\[place] [address]\[facility] [place] Office: [phone]\[facility] [place] Direct Line: [phone]\[facility] [place] Fax: [phone]\[facility] [place] Email: [email or web address] I was present during the key portion of the procedure.

## 2023-07-09 NOTE — ED PROCEDURE NOTE - NS_POSTPROCCAREGUIDE_ED_ALL_ED
Patient is now fully awake, with vital signs and temperature stable, hydration is adequate, patients Tawanna’s  score is at baseline (or greater than 8), patient and escort has received  discharge education.

## 2023-07-09 NOTE — ED PROVIDER NOTE - OBJECTIVE STATEMENT
Patient c/o right hip pain, Felt pop while in shower, Had right hip replacement 3 mos ago, Did not fall, C/o pain, unable to move leg,

## 2023-07-09 NOTE — ED PROVIDER NOTE - NS ED ATTENDING STATEMENT MOD
Statement Selected
This was a shared visit with the NICOL. I reviewed and verified the documentation and independently performed the documented:

## 2023-07-09 NOTE — ED ADULT NURSE NOTE - NSFALLHARMRISKINTERV_ED_ALL_ED

## 2023-07-09 NOTE — ED PROVIDER NOTE - PATIENT PORTAL LINK FT
You can access the FollowMyHealth Patient Portal offered by [facility] by registering at the following website: [email or web address]. By joining [facility]’s FollowMyHealth portal, you will also be able to view your health information using other applications (apps) compatible with our system.

## 2023-07-09 NOTE — ED PROVIDER NOTE - CLINICAL SUMMARY MEDICAL DECISION MAKING FREE TEXT BOX
we obtained x-rays confirm dislocation patient had successful reduction with use of sedation here in the emergency department she was observed for sedation protocols patient actually greatly improved0 we applied a knee immobilizer and discharged  advised to follow-up with orthopedics in the next 24 to 48 hours post procedure is consistent pedal pulses 2+ and equal capillary refills normal   given sedation discharge instructions

## 2023-07-09 NOTE — ED ADULT TRIAGE NOTE - CHIEF COMPLAINT QUOTE
As per EMS, patient had right hip replacement 3 months ago. Today patient was taking a shower, when patient moved right leg upwards she felt a pop. Patient states "I think I dislocated my hip".

## 2024-08-28 NOTE — ED ADULT NURSE NOTE - PRO INTERPRETER NEED 2
Spoke with patient, she states her son usually helps her with her medications but he is out of town. Her daughter is with her, but just arrived today to help her. She is concerned because she does not have Trulicity. Per last office visit, Trulicity was on back order and pt has been taking Ozempic 1 mg. Pt states she does not have Ozempic at home. Per lab notes 7/15, pt is to increase her Ozempic dose to 2 mg.     Called the pharmacy, pt picked up 3 mL (1 pen/1 month supply) on 7/18.     Called pt back and explained she is due to  Ozempic 2 mg. She states she has not had the medication in about 2 weeks.   Pt is requesting to see a doctor. Checked schedule, no openings until January. Pt placed on wait list. Offered NV to pt to review medications. Pt agreeable. Scheduled for tomorrow. Pt advised to bring glucometer and medications.    English no

## 2025-02-09 ENCOUNTER — EMERGENCY (EMERGENCY)
Facility: HOSPITAL | Age: 85
LOS: 0 days | Discharge: HOME | End: 2025-02-09
Attending: EMERGENCY MEDICINE
Payer: MEDICARE

## 2025-02-09 VITALS
SYSTOLIC BLOOD PRESSURE: 135 MMHG | RESPIRATION RATE: 18 BRPM | DIASTOLIC BLOOD PRESSURE: 82 MMHG | HEART RATE: 92 BPM | TEMPERATURE: 98 F | WEIGHT: 139.99 LBS | OXYGEN SATURATION: 98 %

## 2025-02-09 VITALS
RESPIRATION RATE: 20 BRPM | SYSTOLIC BLOOD PRESSURE: 109 MMHG | DIASTOLIC BLOOD PRESSURE: 59 MMHG | OXYGEN SATURATION: 95 % | HEART RATE: 104 BPM

## 2025-02-09 PROCEDURE — 99152 MOD SED SAME PHYS/QHP 5/>YRS: CPT

## 2025-02-09 PROCEDURE — 27265 TREAT HIP DISLOCATION: CPT | Mod: 54,RT

## 2025-02-09 PROCEDURE — 99285 EMERGENCY DEPT VISIT HI MDM: CPT | Mod: 25

## 2025-02-09 PROCEDURE — 96374 THER/PROPH/DIAG INJ IV PUSH: CPT | Mod: XU

## 2025-02-09 PROCEDURE — 27265 TREAT HIP DISLOCATION: CPT | Mod: RT

## 2025-02-09 PROCEDURE — 73502 X-RAY EXAM HIP UNI 2-3 VIEWS: CPT | Mod: 26,RT

## 2025-02-09 PROCEDURE — 73502 X-RAY EXAM HIP UNI 2-3 VIEWS: CPT | Mod: 26,RT,77

## 2025-02-09 PROCEDURE — 73502 X-RAY EXAM HIP UNI 2-3 VIEWS: CPT | Mod: RT

## 2025-02-09 PROCEDURE — 96376 TX/PRO/DX INJ SAME DRUG ADON: CPT | Mod: XU

## 2025-02-09 PROCEDURE — 99285 EMERGENCY DEPT VISIT HI MDM: CPT | Mod: 57

## 2025-02-09 RX ORDER — PROPOFOL 10 MG/ML
20 INJECTION, EMULSION INTRAVENOUS ONCE
Refills: 0 | Status: COMPLETED | OUTPATIENT
Start: 2025-02-09 | End: 2025-02-09

## 2025-02-09 RX ORDER — MORPHINE SULFATE 60 MG/1
4 TABLET, FILM COATED, EXTENDED RELEASE ORAL ONCE
Refills: 0 | Status: DISCONTINUED | OUTPATIENT
Start: 2025-02-09 | End: 2025-02-09

## 2025-02-09 RX ORDER — KETAMINE HCL IN NACL, ISO-OSM 100MG/10ML
20 SYRINGE (ML) INJECTION ONCE
Refills: 0 | Status: DISCONTINUED | OUTPATIENT
Start: 2025-02-09 | End: 2025-02-09

## 2025-02-09 RX ORDER — KETAMINE HCL IN NACL, ISO-OSM 100MG/10ML
30 SYRINGE (ML) INJECTION ONCE
Refills: 0 | Status: DISCONTINUED | OUTPATIENT
Start: 2025-02-09 | End: 2025-02-09

## 2025-02-09 RX ORDER — PROPOFOL 10 MG/ML
30 INJECTION, EMULSION INTRAVENOUS ONCE
Refills: 0 | Status: COMPLETED | OUTPATIENT
Start: 2025-02-09 | End: 2025-02-09

## 2025-02-09 RX ADMIN — PROPOFOL 20 MILLIGRAM(S): 10 INJECTION, EMULSION INTRAVENOUS at 18:05

## 2025-02-09 RX ADMIN — MORPHINE SULFATE 4 MILLIGRAM(S): 60 TABLET, FILM COATED, EXTENDED RELEASE ORAL at 16:49

## 2025-02-09 RX ADMIN — Medication 30 MILLIGRAM(S): at 17:50

## 2025-02-09 RX ADMIN — MORPHINE SULFATE 4 MILLIGRAM(S): 60 TABLET, FILM COATED, EXTENDED RELEASE ORAL at 18:43

## 2025-02-09 RX ADMIN — MORPHINE SULFATE 4 MILLIGRAM(S): 60 TABLET, FILM COATED, EXTENDED RELEASE ORAL at 21:22

## 2025-02-09 RX ADMIN — Medication 20 MILLIGRAM(S): at 18:05

## 2025-02-09 RX ADMIN — PROPOFOL 30 MILLIGRAM(S): 10 INJECTION, EMULSION INTRAVENOUS at 17:50

## 2025-02-09 NOTE — ED ADULT NURSE REASSESSMENT NOTE - NS ED NURSE REASSESS COMMENT FT1
Patient being transferred to [facility]. Report given to receiving nurse Virginia. Awaiting transport.

## 2025-02-09 NOTE — ED PROVIDER NOTE - PHYSICAL EXAMINATION
Physical Exam    Vital Signs: I have reviewed the initial vital signs.  Constitutional: appears stated age, no acute distress  Eyes: Conjunctiva pink, Sclera clear,   Cardiovascular: S1 and S2, regular rate, regular rhythm, well-perfused extremities, radial pulses equal and 2+, pedal pulses 2+ and equal  Respiratory: unlabored respiratory effort, clear to auscultation bilaterally no wheezing, rales and rhonchi  Gastrointestinal: soft, non-tender abdomen, no pulsatile mass, normal bowl sounds  Musculoskeletal: supple neck, no lower extremity edema, no midline tenderness. Right lower extremity shortened and internally rotated  Integumentary: warm, dry, no rash  Neurologic: awake, alert,

## 2025-02-09 NOTE — ED PROVIDER NOTE - CLINICAL SUMMARY MEDICAL DECISION MAKING FREE TEXT BOX
Consent was obtained for procedural sedation for closed reduction of the right hip.  We attempted reduction however were unable to do so.  While we were with patient patient's surgeon Dr. Brandon called stating that we will be unable to successfully reduce the hip secondary to patient having constraints in place.  He is requesting transfer to [facility] for definitive repair.  ED team at [facility]  Is aware of patient's pending arrival.  Initially patient's son was refusing to go to [facility] and was trying to find accepting physician at [facility] of special surgery however he was unsuccessful. Pt to be transferred to [facility]. Tier 2, consent obtained.

## 2025-02-09 NOTE — ED PROVIDER NOTE - OBJECTIVE STATEMENT
84-year-old female past medical history of hypertension hyperlipidemia several right sided hip replacements most recently constrained liner hip replacement at [facility], presents to emergency department for hip dislocation.  Patient states was bending over for something and felt hip come out of socket.  No other complaints no other trauma.  Pain is mild aching at site. Denies fever, chills, cp, sob, neck pain, visual changes, nvd, dizziness, numbness, tingling.

## 2025-02-09 NOTE — ED PROVIDER NOTE - ATTENDING APP SHARED VISIT CONTRIBUTION OF CARE
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  Patient states she was sitting sitting in a chair and when she bent over she felt a pop.  Patient has pain at the site, no other injuries, on exam patient in NAD, AAOx3, right leg is shortened, positive tenderness over right hip, positive distal pulses, no edema, abdomen soft nontender

## 2025-02-09 NOTE — ED PROVIDER NOTE - PROGRESS NOTE DETAILS
Dr. Denis orthopedic surgeon at [facility] request for patient to be transferred, ER doctor accepts patient.  Son and patient are refusing transfer at this time states want to find an accepting doctor at [facility].  I spoke with our orthopedic team advised them of the situation and awaiting callback. Son unable to find accepting physician at [facility] of special surgery.  Okay to transfer to [facility]

## 2025-02-09 NOTE — ED ADULT NURSE NOTE - NSICDXPASTMEDICALHX_GEN_ALL_CORE_FT
PAST MEDICAL HISTORY:  COPD, moderate     H/O dislocation of hip     Hyperlipidemia     Hypertension, unspecified type

## 2025-02-09 NOTE — PROCEDURAL SAFETY CHECKLIST WITH OR WITHOUT SEDATION - NSPREEQUIPSUP_GEN_ALL_CORE
done
Quality 130: Documentation Of Current Medications In The Medical Record: Current Medications Documented
Detail Level: Detailed
Quality 110: Preventive Care And Screening: Influenza Immunization: Influenza Immunization not Administered for Documented Reasons.

## 2025-02-12 DIAGNOSIS — E78.5 HYPERLIPIDEMIA, UNSPECIFIED: ICD-10-CM

## 2025-02-12 DIAGNOSIS — Z96.641 PRESENCE OF RIGHT ARTIFICIAL HIP JOINT: ICD-10-CM

## 2025-02-12 DIAGNOSIS — I10 ESSENTIAL (PRIMARY) HYPERTENSION: ICD-10-CM

## 2025-02-12 DIAGNOSIS — T84.020A DISLOCATION OF INTERNAL RIGHT HIP PROSTHESIS, INITIAL ENCOUNTER: ICD-10-CM

## 2025-02-12 DIAGNOSIS — M41.9 SCOLIOSIS, UNSPECIFIED: ICD-10-CM
